# Patient Record
Sex: FEMALE | Race: WHITE | Employment: UNEMPLOYED
[De-identification: names, ages, dates, MRNs, and addresses within clinical notes are randomized per-mention and may not be internally consistent; named-entity substitution may affect disease eponyms.]

---

## 2017-02-06 ENCOUNTER — TELEPHONE (OUTPATIENT)
Dept: FAMILY MEDICINE CLINIC | Facility: CLINIC | Age: 37
End: 2017-02-06

## 2017-02-16 ENCOUNTER — OFFICE VISIT (OUTPATIENT)
Dept: FAMILY MEDICINE CLINIC | Facility: CLINIC | Age: 37
End: 2017-02-16

## 2017-02-16 VITALS
HEART RATE: 78 BPM | BODY MASS INDEX: 28.16 KG/M2 | HEIGHT: 65 IN | DIASTOLIC BLOOD PRESSURE: 64 MMHG | SYSTOLIC BLOOD PRESSURE: 112 MMHG | WEIGHT: 169 LBS | OXYGEN SATURATION: 98 %

## 2017-02-16 DIAGNOSIS — L72.3 SEBACEOUS CYST: Primary | ICD-10-CM

## 2017-02-16 PROCEDURE — G8422 PT INELIG BMI CALCULATION: HCPCS | Performed by: FAMILY MEDICINE

## 2017-02-16 PROCEDURE — 99213 OFFICE O/P EST LOW 20 MIN: CPT | Performed by: FAMILY MEDICINE

## 2017-02-16 ASSESSMENT — ENCOUNTER SYMPTOMS
APNEA: 0
SHORTNESS OF BREATH: 0
CHEST TIGHTNESS: 0

## 2017-02-16 ASSESSMENT — PATIENT HEALTH QUESTIONNAIRE - PHQ9
SUM OF ALL RESPONSES TO PHQ9 QUESTIONS 1 & 2: 0
2. FEELING DOWN, DEPRESSED OR HOPELESS: 0
SUM OF ALL RESPONSES TO PHQ QUESTIONS 1-9: 0
1. LITTLE INTEREST OR PLEASURE IN DOING THINGS: 0

## 2018-05-31 ENCOUNTER — HOSPITAL ENCOUNTER (OUTPATIENT)
Age: 38
Setting detail: SPECIMEN
Discharge: HOME | End: 2018-05-31
Payer: MEDICARE

## 2018-05-31 ENCOUNTER — OFFICE VISIT (OUTPATIENT)
Dept: OBGYN CLINIC | Age: 38
End: 2018-05-31
Payer: MEDICARE

## 2018-05-31 VITALS
WEIGHT: 166 LBS | SYSTOLIC BLOOD PRESSURE: 116 MMHG | BODY MASS INDEX: 27.66 KG/M2 | HEIGHT: 65 IN | HEART RATE: 78 BPM | DIASTOLIC BLOOD PRESSURE: 70 MMHG

## 2018-05-31 DIAGNOSIS — R10.2 PELVIC PRESSURE IN FEMALE: ICD-10-CM

## 2018-05-31 DIAGNOSIS — Z01.419 WELL WOMAN EXAM WITH ROUTINE GYNECOLOGICAL EXAM: ICD-10-CM

## 2018-05-31 DIAGNOSIS — Z12.31 SCREENING MAMMOGRAM, ENCOUNTER FOR: Primary | ICD-10-CM

## 2018-05-31 DIAGNOSIS — N81.89 LOSS OF PERINEAL BODY, FEMALE: ICD-10-CM

## 2018-05-31 DIAGNOSIS — Z01.419 WELL WOMAN EXAM WITH ROUTINE GYNECOLOGICAL EXAM: Primary | ICD-10-CM

## 2018-05-31 DIAGNOSIS — N81.11 CYSTOCELE, MIDLINE: ICD-10-CM

## 2018-05-31 LAB
BILIRUBIN URINE: NEGATIVE
COLOR: YELLOW
COMMENT UA: NORMAL
DIRECT EXAM: NORMAL
GLUCOSE URINE: NEGATIVE
KETONES, URINE: NEGATIVE
LEUKOCYTE ESTERASE, URINE: NEGATIVE
Lab: NORMAL
NITRITE, URINE: NEGATIVE
PH UA: 7 (ref 5–8)
PROTEIN UA: NEGATIVE
SPECIFIC GRAVITY UA: 1.02 (ref 1–1.03)
SPECIMEN DESCRIPTION: NORMAL
STATUS: NORMAL
TURBIDITY: CLEAR
URINE HGB: NEGATIVE
UROBILINOGEN, URINE: NORMAL

## 2018-05-31 PROCEDURE — 99385 PREV VISIT NEW AGE 18-39: CPT | Performed by: OBSTETRICS & GYNECOLOGY

## 2018-05-31 PROCEDURE — 81003 URINALYSIS AUTO W/O SCOPE: CPT

## 2018-05-31 PROCEDURE — 87591 N.GONORRHOEAE DNA AMP PROB: CPT

## 2018-05-31 PROCEDURE — 87624 HPV HI-RISK TYP POOLED RSLT: CPT

## 2018-05-31 PROCEDURE — 87660 TRICHOMONAS VAGIN DIR PROBE: CPT

## 2018-05-31 PROCEDURE — 87480 CANDIDA DNA DIR PROBE: CPT

## 2018-05-31 PROCEDURE — 87510 GARDNER VAG DNA DIR PROBE: CPT

## 2018-05-31 PROCEDURE — G0123 SCREEN CERV/VAG THIN LAYER: HCPCS

## 2018-05-31 PROCEDURE — 87491 CHLMYD TRACH DNA AMP PROBE: CPT

## 2018-06-01 LAB
C TRACH DNA GENITAL QL NAA+PROBE: NEGATIVE
HPV SAMPLE: NORMAL
HPV SOURCE: NORMAL
HPV, GENOTYPE 16: NOT DETECTED
HPV, GENOTYPE 18: NOT DETECTED
HPV, HIGH RISK OTHER: NOT DETECTED
HPV, INTERPRETATION: NORMAL
N. GONORRHOEAE DNA: NEGATIVE

## 2018-06-07 ENCOUNTER — HOSPITAL ENCOUNTER (OUTPATIENT)
Age: 38
Setting detail: SPECIMEN
Discharge: HOME | End: 2018-06-07
Payer: MEDICARE

## 2018-06-07 ENCOUNTER — OFFICE VISIT (OUTPATIENT)
Dept: FAMILY MEDICINE CLINIC | Age: 38
End: 2018-06-07
Payer: MEDICARE

## 2018-06-07 VITALS
BODY MASS INDEX: 27.76 KG/M2 | DIASTOLIC BLOOD PRESSURE: 63 MMHG | RESPIRATION RATE: 16 BRPM | SYSTOLIC BLOOD PRESSURE: 106 MMHG | HEART RATE: 72 BPM | HEIGHT: 65 IN | OXYGEN SATURATION: 98 % | WEIGHT: 166.6 LBS

## 2018-06-07 DIAGNOSIS — Z00.00 ENCOUNTER FOR WELL ADULT EXAM WITHOUT ABNORMAL FINDINGS: ICD-10-CM

## 2018-06-07 DIAGNOSIS — Z00.00 ENCOUNTER FOR WELL ADULT EXAM WITHOUT ABNORMAL FINDINGS: Primary | ICD-10-CM

## 2018-06-07 DIAGNOSIS — Z80.0 FAMILY HISTORY OF COLON CANCER IN MOTHER: ICD-10-CM

## 2018-06-07 LAB
ABSOLUTE EOS #: 0.17 K/UL (ref 0–0.44)
ABSOLUTE IMMATURE GRANULOCYTE: <0.03 K/UL (ref 0–0.3)
ABSOLUTE LYMPH #: 1.82 K/UL (ref 1.1–3.7)
ABSOLUTE MONO #: 0.51 K/UL (ref 0.1–1.2)
ALBUMIN SERPL-MCNC: 4.2 G/DL (ref 3.5–5.2)
ALBUMIN/GLOBULIN RATIO: 1.4 (ref 1–2.5)
ALP BLD-CCNC: 68 U/L (ref 35–104)
ALT SERPL-CCNC: 9 U/L (ref 5–33)
ANION GAP SERPL CALCULATED.3IONS-SCNC: 10 MMOL/L (ref 9–17)
AST SERPL-CCNC: 12 U/L
BASOPHILS # BLD: 1 % (ref 0–2)
BASOPHILS ABSOLUTE: 0.03 K/UL (ref 0–0.2)
BILIRUB SERPL-MCNC: 0.36 MG/DL (ref 0.3–1.2)
BILIRUBIN URINE: NEGATIVE
BUN BLDV-MCNC: 15 MG/DL (ref 6–20)
BUN/CREAT BLD: NORMAL (ref 9–20)
CALCIUM SERPL-MCNC: 8.7 MG/DL (ref 8.6–10.4)
CHLORIDE BLD-SCNC: 105 MMOL/L (ref 98–107)
CO2: 25 MMOL/L (ref 20–31)
COLOR: YELLOW
COMMENT UA: NORMAL
CREAT SERPL-MCNC: 0.55 MG/DL (ref 0.5–0.9)
DIFFERENTIAL TYPE: NORMAL
EOSINOPHILS RELATIVE PERCENT: 3 % (ref 1–4)
GFR AFRICAN AMERICAN: >60 ML/MIN
GFR NON-AFRICAN AMERICAN: >60 ML/MIN
GFR SERPL CREATININE-BSD FRML MDRD: NORMAL ML/MIN/{1.73_M2}
GFR SERPL CREATININE-BSD FRML MDRD: NORMAL ML/MIN/{1.73_M2}
GLUCOSE BLD-MCNC: 83 MG/DL (ref 70–99)
GLUCOSE URINE: NEGATIVE
HCT VFR BLD CALC: 41.9 % (ref 36.3–47.1)
HEMOGLOBIN: 14 G/DL (ref 11.9–15.1)
IMMATURE GRANULOCYTES: 0 %
KETONES, URINE: NEGATIVE
LEUKOCYTE ESTERASE, URINE: NEGATIVE
LYMPHOCYTES # BLD: 32 % (ref 24–43)
MCH RBC QN AUTO: 31 PG (ref 25.2–33.5)
MCHC RBC AUTO-ENTMCNC: 33.4 G/DL (ref 28.4–34.8)
MCV RBC AUTO: 92.9 FL (ref 82.6–102.9)
MONOCYTES # BLD: 9 % (ref 3–12)
NITRITE, URINE: NEGATIVE
NRBC AUTOMATED: 0 PER 100 WBC
PDW BLD-RTO: 12.3 % (ref 11.8–14.4)
PH UA: 6.5 (ref 5–8)
PLATELET # BLD: 172 K/UL (ref 138–453)
PLATELET ESTIMATE: NORMAL
PMV BLD AUTO: 9.7 FL (ref 8.1–13.5)
POTASSIUM SERPL-SCNC: 4 MMOL/L (ref 3.7–5.3)
PROTEIN UA: NEGATIVE
RBC # BLD: 4.51 M/UL (ref 3.95–5.11)
RBC # BLD: NORMAL 10*6/UL
SEG NEUTROPHILS: 55 % (ref 36–65)
SEGMENTED NEUTROPHILS ABSOLUTE COUNT: 3.21 K/UL (ref 1.5–8.1)
SODIUM BLD-SCNC: 140 MMOL/L (ref 135–144)
SPECIFIC GRAVITY UA: 1.02 (ref 1–1.03)
THYROXINE, FREE: 1.17 NG/DL (ref 0.93–1.7)
TOTAL PROTEIN: 7.3 G/DL (ref 6.4–8.3)
TSH SERPL DL<=0.05 MIU/L-ACNC: 2.62 MIU/L (ref 0.3–5)
TURBIDITY: CLEAR
URINE HGB: NEGATIVE
UROBILINOGEN, URINE: NORMAL
WBC # BLD: 5.8 K/UL (ref 3.5–11.3)
WBC # BLD: NORMAL 10*3/UL

## 2018-06-07 PROCEDURE — 99395 PREV VISIT EST AGE 18-39: CPT | Performed by: FAMILY MEDICINE

## 2018-06-07 ASSESSMENT — PATIENT HEALTH QUESTIONNAIRE - PHQ9
1. LITTLE INTEREST OR PLEASURE IN DOING THINGS: 0
SUM OF ALL RESPONSES TO PHQ QUESTIONS 1-9: 0
2. FEELING DOWN, DEPRESSED OR HOPELESS: 0
SUM OF ALL RESPONSES TO PHQ9 QUESTIONS 1 & 2: 0

## 2018-06-08 LAB — THYROID PEROXIDASE (TPO) AB: <10 IU/ML (ref 0–35)

## 2018-06-25 LAB — CYTOLOGY REPORT: NORMAL

## 2018-06-29 ENCOUNTER — TELEPHONE (OUTPATIENT)
Dept: OBGYN CLINIC | Age: 38
End: 2018-06-29

## 2018-06-29 NOTE — TELEPHONE ENCOUNTER
----- Message from RAH Laguerre CNP sent at 6/25/2018  7:59 AM EDT -----  Pap smear with insufficient cells- predominately mucus  Please let patient know and schedule in office for repeat pap smear

## 2018-07-24 ENCOUNTER — TELEPHONE (OUTPATIENT)
Dept: OBGYN CLINIC | Age: 38
End: 2018-07-24

## 2018-07-24 NOTE — TELEPHONE ENCOUNTER
----- Message from Silvano Apley, APRN - CNP sent at 6/25/2018  7:59 AM EDT -----  Pap smear with insufficient cells- predominately mucus  Please let patient know and schedule in office for repeat pap smear

## 2018-11-12 ENCOUNTER — OFFICE VISIT (OUTPATIENT)
Dept: FAMILY MEDICINE CLINIC | Age: 38
End: 2018-11-12
Payer: MEDICARE

## 2018-11-12 VITALS
TEMPERATURE: 97.6 F | HEART RATE: 100 BPM | SYSTOLIC BLOOD PRESSURE: 114 MMHG | OXYGEN SATURATION: 96 % | DIASTOLIC BLOOD PRESSURE: 64 MMHG

## 2018-11-12 DIAGNOSIS — J01.90 ACUTE SINUSITIS, RECURRENCE NOT SPECIFIED, UNSPECIFIED LOCATION: Primary | ICD-10-CM

## 2018-11-12 PROCEDURE — 1036F TOBACCO NON-USER: CPT | Performed by: FAMILY MEDICINE

## 2018-11-12 PROCEDURE — G8484 FLU IMMUNIZE NO ADMIN: HCPCS | Performed by: FAMILY MEDICINE

## 2018-11-12 PROCEDURE — G8427 DOCREV CUR MEDS BY ELIG CLIN: HCPCS | Performed by: FAMILY MEDICINE

## 2018-11-12 PROCEDURE — 99213 OFFICE O/P EST LOW 20 MIN: CPT | Performed by: FAMILY MEDICINE

## 2018-11-12 PROCEDURE — G8419 CALC BMI OUT NRM PARAM NOF/U: HCPCS | Performed by: FAMILY MEDICINE

## 2018-11-12 RX ORDER — FLUTICASONE PROPIONATE 50 MCG
1 SPRAY, SUSPENSION (ML) NASAL DAILY
Qty: 2 BOTTLE | Refills: 1 | Status: SHIPPED | OUTPATIENT
Start: 2018-11-12 | End: 2019-09-10

## 2018-12-26 ENCOUNTER — TELEPHONE (OUTPATIENT)
Dept: OBGYN CLINIC | Age: 38
End: 2018-12-26

## 2019-05-01 ENCOUNTER — OFFICE VISIT (OUTPATIENT)
Dept: FAMILY MEDICINE CLINIC | Age: 39
End: 2019-05-01
Payer: MEDICARE

## 2019-05-01 ENCOUNTER — HOSPITAL ENCOUNTER (OUTPATIENT)
Age: 39
Setting detail: SPECIMEN
Discharge: HOME | End: 2019-05-01
Payer: MEDICARE

## 2019-05-01 VITALS
BODY MASS INDEX: 29.12 KG/M2 | SYSTOLIC BLOOD PRESSURE: 95 MMHG | OXYGEN SATURATION: 98 % | WEIGHT: 175 LBS | DIASTOLIC BLOOD PRESSURE: 63 MMHG | HEART RATE: 89 BPM

## 2019-05-01 DIAGNOSIS — N92.6 MISSED MENSES: ICD-10-CM

## 2019-05-01 DIAGNOSIS — G89.29 CHRONIC INTRACTABLE HEADACHE, UNSPECIFIED HEADACHE TYPE: ICD-10-CM

## 2019-05-01 DIAGNOSIS — N81.89 LOSS OF PERINEAL BODY, FEMALE: ICD-10-CM

## 2019-05-01 DIAGNOSIS — N81.11 CYSTOCELE, MIDLINE: ICD-10-CM

## 2019-05-01 DIAGNOSIS — R51.9 CHRONIC INTRACTABLE HEADACHE, UNSPECIFIED HEADACHE TYPE: ICD-10-CM

## 2019-05-01 DIAGNOSIS — M62.89 PELVIC FLOOR DYSFUNCTION IN FEMALE: Primary | ICD-10-CM

## 2019-05-01 DIAGNOSIS — Z80.0 FAMILY HISTORY OF COLON CANCER IN MOTHER: ICD-10-CM

## 2019-05-01 LAB
ABSOLUTE EOS #: 0.14 K/UL (ref 0–0.44)
ABSOLUTE IMMATURE GRANULOCYTE: 0.03 K/UL (ref 0–0.3)
ABSOLUTE LYMPH #: 1.97 K/UL (ref 1.1–3.7)
ABSOLUTE MONO #: 0.46 K/UL (ref 0.1–1.2)
ALBUMIN SERPL-MCNC: 4.4 G/DL (ref 3.5–5.2)
ALBUMIN/GLOBULIN RATIO: 1.5 (ref 1–2.5)
ALP BLD-CCNC: 72 U/L (ref 35–104)
ALT SERPL-CCNC: 8 U/L (ref 5–33)
ANION GAP SERPL CALCULATED.3IONS-SCNC: 11 MMOL/L (ref 9–17)
AST SERPL-CCNC: 11 U/L
BASOPHILS # BLD: 1 % (ref 0–2)
BASOPHILS ABSOLUTE: 0.04 K/UL (ref 0–0.2)
BILIRUB SERPL-MCNC: 0.41 MG/DL (ref 0.3–1.2)
BILIRUBIN URINE: NEGATIVE
BUN BLDV-MCNC: 19 MG/DL (ref 6–20)
BUN/CREAT BLD: ABNORMAL (ref 9–20)
CALCIUM SERPL-MCNC: 8.9 MG/DL (ref 8.6–10.4)
CHLORIDE BLD-SCNC: 106 MMOL/L (ref 98–107)
CHOLESTEROL/HDL RATIO: 2.4
CHOLESTEROL: 154 MG/DL
CO2: 24 MMOL/L (ref 20–31)
COLOR: YELLOW
COMMENT UA: NORMAL
CREAT SERPL-MCNC: 0.49 MG/DL (ref 0.5–0.9)
DIFFERENTIAL TYPE: ABNORMAL
EOSINOPHILS RELATIVE PERCENT: 3 % (ref 1–4)
GFR AFRICAN AMERICAN: >60 ML/MIN
GFR NON-AFRICAN AMERICAN: >60 ML/MIN
GFR SERPL CREATININE-BSD FRML MDRD: ABNORMAL ML/MIN/{1.73_M2}
GFR SERPL CREATININE-BSD FRML MDRD: ABNORMAL ML/MIN/{1.73_M2}
GLUCOSE BLD-MCNC: 97 MG/DL (ref 70–99)
GLUCOSE URINE: NEGATIVE
HCT VFR BLD CALC: 40.3 % (ref 36.3–47.1)
HDLC SERPL-MCNC: 63 MG/DL
HEMOGLOBIN: 13.5 G/DL (ref 11.9–15.1)
IMMATURE GRANULOCYTES: 1 %
KETONES, URINE: NEGATIVE
LDL CHOLESTEROL: 73 MG/DL (ref 0–130)
LEUKOCYTE ESTERASE, URINE: NEGATIVE
LYMPHOCYTES # BLD: 37 % (ref 24–43)
MCH RBC QN AUTO: 31.2 PG (ref 25.2–33.5)
MCHC RBC AUTO-ENTMCNC: 33.5 G/DL (ref 28.4–34.8)
MCV RBC AUTO: 93.1 FL (ref 82.6–102.9)
MONOCYTES # BLD: 9 % (ref 3–12)
NITRITE, URINE: NEGATIVE
NRBC AUTOMATED: 0 PER 100 WBC
PDW BLD-RTO: 12.6 % (ref 11.8–14.4)
PH UA: 8 (ref 5–8)
PLATELET # BLD: 203 K/UL (ref 138–453)
PLATELET ESTIMATE: ABNORMAL
PMV BLD AUTO: 9.7 FL (ref 8.1–13.5)
POTASSIUM SERPL-SCNC: 4.1 MMOL/L (ref 3.7–5.3)
PROTEIN UA: NEGATIVE
RBC # BLD: 4.33 M/UL (ref 3.95–5.11)
RBC # BLD: ABNORMAL 10*6/UL
SEG NEUTROPHILS: 49 % (ref 36–65)
SEGMENTED NEUTROPHILS ABSOLUTE COUNT: 2.74 K/UL (ref 1.5–8.1)
SODIUM BLD-SCNC: 141 MMOL/L (ref 135–144)
SPECIFIC GRAVITY UA: 1.02 (ref 1–1.03)
THYROXINE, FREE: 1.24 NG/DL (ref 0.93–1.7)
TOTAL PROTEIN: 7.3 G/DL (ref 6.4–8.3)
TRIGL SERPL-MCNC: 89 MG/DL
TSH SERPL DL<=0.05 MIU/L-ACNC: 2.05 MIU/L (ref 0.3–5)
TURBIDITY: CLEAR
URINE HGB: NEGATIVE
UROBILINOGEN, URINE: NORMAL
VLDLC SERPL CALC-MCNC: NORMAL MG/DL (ref 1–30)
WBC # BLD: 5.4 K/UL (ref 3.5–11.3)
WBC # BLD: ABNORMAL 10*3/UL

## 2019-05-01 PROCEDURE — G8427 DOCREV CUR MEDS BY ELIG CLIN: HCPCS | Performed by: FAMILY MEDICINE

## 2019-05-01 PROCEDURE — 1036F TOBACCO NON-USER: CPT | Performed by: FAMILY MEDICINE

## 2019-05-01 PROCEDURE — 99213 OFFICE O/P EST LOW 20 MIN: CPT | Performed by: FAMILY MEDICINE

## 2019-05-01 PROCEDURE — G8419 CALC BMI OUT NRM PARAM NOF/U: HCPCS | Performed by: FAMILY MEDICINE

## 2019-05-01 ASSESSMENT — PATIENT HEALTH QUESTIONNAIRE - PHQ9
2. FEELING DOWN, DEPRESSED OR HOPELESS: 0
SUM OF ALL RESPONSES TO PHQ QUESTIONS 1-9: 0
SUM OF ALL RESPONSES TO PHQ9 QUESTIONS 1 & 2: 0
SUM OF ALL RESPONSES TO PHQ QUESTIONS 1-9: 0
1. LITTLE INTEREST OR PLEASURE IN DOING THINGS: 0

## 2019-05-01 NOTE — PROGRESS NOTES
General FM note    Jesusita Cherry is a 34 y.o. female who presents today for follow up on her  medical conditions as noted below. Jesusita Cherry is c/o of   Chief Complaint   Patient presents with    Pelvic Pain       Patient Active Problem List:     Missed menses     Advanced maternal age in multigravida     Cystocele, midline grade 1     Pelvic pressure in female     Loss of perineal body, female     Past Medical History:   Diagnosis Date    Depression     mild occas, no meds    Gestational hypertension     Pregnancy 1 & 2      Past Surgical History:   Procedure Laterality Date    TONSILLECTOMY AND ADENOIDECTOMY      WISDOM TOOTH EXTRACTION       Family History   Problem Relation Age of Onset    Diabetes Maternal Grandmother     Breast Cancer Maternal Grandmother         unsure of age   Tushar Medina Diabetes Maternal Grandfather     Colon Cancer Mother 48    Cervical Cancer Mother     Cancer Sister         hodgkins    Hypertension Father      Current Outpatient Medications   Medication Sig Dispense Refill    fluticasone (FLONASE) 50 MCG/ACT nasal spray 1 spray by Each Nare route daily 1 Spray in each nostril 2 Bottle 1     No current facility-administered medications for this visit. ALLERGIES:  No Known Allergies    Social History     Tobacco Use    Smoking status: Never Smoker    Smokeless tobacco: Never Used   Substance Use Topics    Alcohol use: No     Alcohol/week: 0.0 oz      Body mass index is 29.12 kg/m². BP 95/63   Pulse 89   Wt 175 lb (79.4 kg)   SpO2 98%   BMI 29.12 kg/m²     Subjective:      HPI    33 yo female here for pain at her tail bone progressing started around 18 years ago. Pain is there at time very severe. When getting up pain there. Weekly pain in her lower pelvic area and also at the sacrum and rectal area. Had a period twice this month. Never had this before. Was seen at the Gyn before - \"cystocele grade 1, loss of perineal body\".   The patient tells me that this has been going on for quite some time. She also has an extensive history of cancer in her family history. Her mom was diagnosed with colon cancer then with cervical cancer. A sister had Hodgkin's lymphoma. Multiple aunts in her family had breast cancer. The patient feels that she needs to take care of herself, that she needs to make sure everything is in order. Review of Systems   Constitutional: Negative for fever and unexpected weight change. Respiratory: Negative for cough and shortness of breath. Cardiovascular: Negative for chest pain and leg swelling. Gastrointestinal: Negative for diarrhea, constipation and blood in stool. Endocrine: Negative for polydipsia and polyuria. Genitourinary: Negative for dysuria and hematuria. Positive for pelvic discomfort/pain. Musculoskeletal: Negative for back pain and gait problem. Positive for sacral pain. Skin: Negative for color change and rash. Objective:   Physical Exam  Constitutional: VS (see above). General appearance: normal development, habitus and attention, no deformities. No distress. Eyes: normal conjunctiva and lids. CAV: RRR, no RMG. No edema lower extremities. Pulmo: CTA bilateral, no CWR. Skin: no rashes, lesions or ulcers. Musculoskeletal: normal gait. Nails: no clubbing or cyanosis. Psychiatric: alert and oriented to place, time and person. Normal mood and affect. Assessment:       Diagnosis Orders   1. Pelvic floor dysfunction in female  US PELVIS COMPLETE    US NON OB TRANSVAGINAL    XR SACRUM COCCYX (MIN 2 VIEWS)   2. Family history of colon cancer in mother  External Referral To Gastroenterology   3. Missed menses  US PELVIS COMPLETE    US NON OB TRANSVAGINAL    XR SACRUM COCCYX (MIN 2 VIEWS)    CBC Auto Differential    Urinalysis    TSH without Reflex    T4, Free    Lipid Panel    Comprehensive Metabolic Panel   4.  Loss of perineal body, female  US PELVIS COMPLETE    US NON OB TRANSVAGINAL    XR SACRUM COCCYX (MIN 2 VIEWS)    CBC Auto Differential    Urinalysis    TSH without Reflex    T4, Free    Lipid Panel    Comprehensive Metabolic Panel   5. Cystocele, midline grade 1  US PELVIS COMPLETE    US NON OB TRANSVAGINAL    XR SACRUM COCCYX (MIN 2 VIEWS)    CBC Auto Differential    Urinalysis    TSH without Reflex    T4, Free    Lipid Panel    Comprehensive Metabolic Panel   6. Chronic intractable headache, unspecified headache type  CBC Auto Differential    Urinalysis    TSH without Reflex    T4, Free    Lipid Panel    Comprehensive Metabolic Panel       Plan:   I discussed with patient that she needs a close follow-up with a gynecologist but also I will refer her to a gastroenterologist for a colonoscopy. The patient will have blood work done and then again she needs to make sure that she follows up with any symptoms. Call or return to clinic prn if these symptoms worsen or fail to improve as anticipated. I have reviewed the instructions with the patient, answering all questions to her satisfaction. No follow-ups on file. Orders Placed This Encounter   Procedures    US PELVIS COMPLETE     Standing Status:   Future     Standing Expiration Date:   5/1/2020    US NON OB TRANSVAGINAL     Standing Status:   Future     Standing Expiration Date:   5/1/2020    XR SACRUM COCCYX (MIN 2 VIEWS)     Standing Status:   Future     Standing Expiration Date:   5/1/2020    CBC Auto Differential     Standing Status:   Future     Number of Occurrences:   1     Standing Expiration Date:   5/1/2020    Urinalysis     Standing Status:   Future     Number of Occurrences:   1     Standing Expiration Date:   5/1/2020     Order Specific Question:   SPECIFY(EX-CATH,MIDSTREAM,CYSTO,ETC)?      Answer:   midstream    TSH without Reflex     Standing Status:   Future     Number of Occurrences:   1     Standing Expiration Date:   5/1/2020    T4, Free     Standing Status:   Future     Number of Occurrences:   1     Standing Expiration Date:   5/1/2020 depression     Electronically signed by Silvestre Acosta MD on 5/1/2019 at 12:48 PM       (Please note that portions of this note were completed with a voice recognition program. Efforts were made to edit the dictations but occasionally words are mis-transcribed.)

## 2019-05-01 NOTE — PROGRESS NOTES
Visit Information    Have you changed or started any medications since your last visit including any over-the-counter medicines, vitamins, or herbal medicines? no   Are you having any side effects from any of your medications? -  no  Have you stopped taking any of your medications? Is so, why? -  no    Have you seen any other physician or provider since your last visit? No  Have you had any other diagnostic tests since your last visit? No  Have you been seen in the emergency room and/or had an admission to a hospital since we last saw you? No  Have you had your routine dental cleaning in the past 6 months? no    Have you activated your [employer] account? If not, what are your barriers?  Yes     Patient Care Team:  Karen Carrizales MD as PCP - General (Family Medicine)  Karen Carrizales MD as PCP - [facility] Attributed Provider    Medical History Review  Past Medical, Family, and Social History reviewed and does not contribute to the patient presenting condition    Health Maintenance   Topic Date Due    HIV screen  12/04/1993    DTaP/Tdap/Td vaccine (1 - Tdap) 12/04/1997    Lipid screen  12/04/2018    Flu vaccine (Season Ended) 09/01/2019    Cervical cancer screen  05/31/2023    Pneumococcal 0-64 years Vaccine  Aged Out

## 2019-06-20 ENCOUNTER — HOSPITAL ENCOUNTER (OUTPATIENT)
Age: 39
Setting detail: SPECIMEN
Discharge: HOME | End: 2019-06-20
Payer: MEDICARE

## 2019-06-20 ENCOUNTER — OFFICE VISIT (OUTPATIENT)
Dept: FAMILY MEDICINE CLINIC | Age: 39
End: 2019-06-20
Payer: MEDICARE

## 2019-06-20 VITALS
HEART RATE: 77 BPM | DIASTOLIC BLOOD PRESSURE: 73 MMHG | OXYGEN SATURATION: 100 % | SYSTOLIC BLOOD PRESSURE: 107 MMHG | BODY MASS INDEX: 29.12 KG/M2 | HEIGHT: 65 IN

## 2019-06-20 DIAGNOSIS — R55 VASOVAGAL SYNCOPE: Primary | ICD-10-CM

## 2019-06-20 DIAGNOSIS — R55 VASOVAGAL SYNCOPE: ICD-10-CM

## 2019-06-20 LAB
ANION GAP SERPL CALCULATED.3IONS-SCNC: 13 MMOL/L (ref 9–17)
BILIRUBIN URINE: NEGATIVE
BUN BLDV-MCNC: 25 MG/DL (ref 6–20)
BUN/CREAT BLD: ABNORMAL (ref 9–20)
CALCIUM SERPL-MCNC: 8.7 MG/DL (ref 8.6–10.4)
CHLORIDE BLD-SCNC: 105 MMOL/L (ref 98–107)
CO2: 23 MMOL/L (ref 20–31)
COLOR: YELLOW
COMMENT UA: ABNORMAL
CREAT SERPL-MCNC: 0.65 MG/DL (ref 0.5–0.9)
GFR AFRICAN AMERICAN: >60 ML/MIN
GFR NON-AFRICAN AMERICAN: >60 ML/MIN
GFR SERPL CREATININE-BSD FRML MDRD: ABNORMAL ML/MIN/{1.73_M2}
GFR SERPL CREATININE-BSD FRML MDRD: ABNORMAL ML/MIN/{1.73_M2}
GLUCOSE BLD-MCNC: 94 MG/DL (ref 70–99)
GLUCOSE URINE: NEGATIVE
KETONES, URINE: NEGATIVE
LEUKOCYTE ESTERASE, URINE: NEGATIVE
NITRITE, URINE: NEGATIVE
PH UA: 6.5 (ref 5–8)
POTASSIUM SERPL-SCNC: 3.8 MMOL/L (ref 3.7–5.3)
PROTEIN UA: NEGATIVE
SODIUM BLD-SCNC: 141 MMOL/L (ref 135–144)
SPECIFIC GRAVITY UA: 1.03 (ref 1–1.03)
TURBIDITY: CLEAR
URINE HGB: NEGATIVE
UROBILINOGEN, URINE: NORMAL

## 2019-06-20 PROCEDURE — 99214 OFFICE O/P EST MOD 30 MIN: CPT | Performed by: FAMILY MEDICINE

## 2019-06-20 PROCEDURE — G8428 CUR MEDS NOT DOCUMENT: HCPCS | Performed by: FAMILY MEDICINE

## 2019-06-20 PROCEDURE — 1036F TOBACCO NON-USER: CPT | Performed by: FAMILY MEDICINE

## 2019-06-20 PROCEDURE — G8419 CALC BMI OUT NRM PARAM NOF/U: HCPCS | Performed by: FAMILY MEDICINE

## 2019-06-20 NOTE — PROGRESS NOTES
General FM note    Amber Ayala is a 34 y.o. female who presents today for follow up on her  medical conditions as noted below. Amber Ayala is c/o of   Chief Complaint   Patient presents with    Headache    Dizziness       Patient Active Problem List:     Missed menses     Advanced maternal age in multigravida     Cystocele, midline grade 1     Pelvic pressure in female     Loss of perineal body, female     Past Medical History:   Diagnosis Date    Depression     mild occas, no meds    Gestational hypertension     Pregnancy 1 & 2      Past Surgical History:   Procedure Laterality Date    TONSILLECTOMY AND ADENOIDECTOMY      WISDOM TOOTH EXTRACTION       Family History   Problem Relation Age of Onset    Diabetes Maternal Grandmother     Breast Cancer Maternal Grandmother         unsure of age   Willard Flax Diabetes Maternal Grandfather     Colon Cancer Mother 48    Cervical Cancer Mother     Cancer Sister         hodgkins    Hypertension Father      Current Outpatient Medications   Medication Sig Dispense Refill    fluticasone (FLONASE) 50 MCG/ACT nasal spray 1 spray by Each Nare route daily 1 Spray in each nostril 2 Bottle 1     No current facility-administered medications for this visit. ALLERGIES:  No Known Allergies    Social History     Tobacco Use    Smoking status: Never Smoker    Smokeless tobacco: Never Used   Substance Use Topics    Alcohol use: No     Alcohol/week: 0.0 oz      Body mass index is 29.12 kg/m². /73   Pulse 77   Ht 5' 5\" (1.651 m)   SpO2 100%   BMI 29.12 kg/m²     Subjective:      HPI    35 yo female here for concerns about almost passing out. Syncope  Patient complains of near syncope. Onset was 10 days ago. Symptoms have  worsened since that time. Patient describes the episode as never actually lost consciousness, had precursor Sx only, including feeling of almost losing consciousness, nausea, severe lightheadedness, vertigo. Associated symptoms: nausea. The patient denies tachycardia/palpitations. Medications putting patient at risk for syncope: none. Happened another times almost when standing. The patient tells me that the first episode she had was at a graduation party she get up from a table and she was walking to restroom when she started to feel dizzy started like feeling that she would pass out. Afterwards she was sitting down eating some crackers drinking this feeling passed now she had it at least 2 other times where she had similar symptoms when getting up walking she had this feeling like passing out. She never actually did pass out. She denies any other symptoms including headaches vision changes chest pain chest discomfort palpitations shortness of breath breathing difficulties any diet changes any changes in weight. Has not followed up with the uro-gynecologist as discussed with her at her last visit. Review of Systems   Constitutional: Negative for fever and unexpected weight change. Positive for passing out feeling. Respiratory: Negative for cough and shortness of breath. Cardiovascular: Negative for chest pain and leg swelling. Gastrointestinal: Negative for diarrhea, constipation and blood in stool. Skin: Negative for color change and rash. Neurological: Negative for dizziness and headaches. Objective:   Physical Exam  Constitutional: VS (see above). General appearance: normal development, habitus and attention, no deformities. No distress. Eyes: normal conjunctiva and lids. CAV: RRR, no RMG. No edema lower extremities. Pulmo: CTA bilateral, no CWR. Skin: no rashes, lesions or ulcers. Musculoskeletal: normal gait. Nails: no clubbing or cyanosis. Psychiatric: alert and oriented to place, time and person. Normal mood and affect. Assessment:       Diagnosis Orders   1. Vasovagal syncope  [facility]) - Kushal Soto MD, Cardiology, [facility]    Urinalysis    Basic Metabolic Panel       Plan:    To me this sounds more low so like vasovagal syncope. I discussed with patient to eat and drink enough if getting up she needs to make sure to move her legs to get her blood flowing. Follow-up with cardiology. Get blood work done if this does not get better patient needs to come back. Call or return to clinic prn if these symptoms worsen or fail to improve as anticipated. I have reviewed the instructions with the patient, answering all questions to her satisfaction. Return in about 6 months (around 12/20/2019), or if symptoms worsen or fail to improve. Orders Placed This Encounter   Procedures    Urinalysis     Standing Status:   Future     Number of Occurrences:   1     Standing Expiration Date:   6/20/2020     Order Specific Question:   SPECIFY(EX-CATH,MIDSTREAM,CYSTO,ETC)? Answer:   midstream    Basic Metabolic Panel     Standing Status:   Future     Number of Occurrences:   1     Standing Expiration Date:   6/20/2020    [facility]) - Kd Moore MD, Cardiology, [facility]     Referral Priority:   Routine     Referral Type:   Eval and Treat     Referral Reason:   Specialty Services Required     Referred to Provider:   Tu Holland MD     Requested Specialty:   Cardiology     Number of Visits Requested:   1     No orders of the defined types were placed in this encounter. Ugo received counseling on the following healthy behaviors: nutrition and exercise  Reviewed prior labs and health maintenance. Continue current medications, diet and exercise. Discussed use, benefit, and side effects of prescribed medications. Barriers to medication compliance addressed. Patient given educational materials - see patient instructions. All patient questions answered. Patient voiced understanding.       Electronically signed by Alia Phelan MD on 6/21/2019 at 6:43 AM       (Please note that portions of this note were completed with a voice recognition program. Efforts were made to edit the dictations but occasionally words are mis-transcribed.)

## 2019-06-20 NOTE — PROGRESS NOTES
Visit Information    Have you changed or started any medications since your last visit including any over-the-counter medicines, vitamins, or herbal medicines? no   Are you having any side effects from any of your medications? -  no  Have you stopped taking any of your medications? Is so, why? -  no    Have you seen any other physician or provider since your last visit? No  Have you had any other diagnostic tests since your last visit? No  Have you been seen in the emergency room and/or had an admission to a hospital since we last saw you? No  Have you had your routine dental cleaning in the past 6 months? no    Have you activated your [employer] account? If not, what are your barriers?  Yes     Patient Care Team:  Latrice Bruce MD as PCP - General (Family Medicine)  Latrice Bruce MD as PCP - [facility]    Medical History Review  Past Medical, Family, and Social History reviewed and does not contribute to the patient presenting condition    Health Maintenance   Topic Date Due    HIV screen  12/04/1993    DTaP/Tdap/Td vaccine (1 - Tdap) 12/04/1997    Flu vaccine (Season Ended) 09/01/2019    Cervical cancer screen  05/31/2023    Lipid screen  05/01/2024    Pneumococcal 0-64 years Vaccine  Aged Out

## 2019-06-24 ENCOUNTER — TELEPHONE (OUTPATIENT)
Dept: FAMILY MEDICINE CLINIC | Age: 39
End: 2019-06-24

## 2019-06-24 DIAGNOSIS — N81.89 LOSS OF PERINEAL BODY, FEMALE: ICD-10-CM

## 2019-06-24 DIAGNOSIS — N92.6 MISSED MENSES: ICD-10-CM

## 2019-06-24 DIAGNOSIS — N81.11 CYSTOCELE, MIDLINE: ICD-10-CM

## 2019-06-24 DIAGNOSIS — M62.89 PELVIC FLOOR DYSFUNCTION IN FEMALE: ICD-10-CM

## 2019-06-24 NOTE — TELEPHONE ENCOUNTER
Pt looking for xray results from [facility] from 5/1/19. I called med Records and they will be faxing the results.

## 2019-08-02 ENCOUNTER — HOSPITAL ENCOUNTER (OUTPATIENT)
Dept: ULTRASOUND IMAGING | Age: 39
Discharge: HOME | End: 2019-08-04
Payer: MEDICARE

## 2019-08-02 DIAGNOSIS — M62.89 PELVIC FLOOR DYSFUNCTION IN FEMALE: ICD-10-CM

## 2019-08-02 DIAGNOSIS — N81.89 LOSS OF PERINEAL BODY, FEMALE: ICD-10-CM

## 2019-08-02 DIAGNOSIS — N92.6 MISSED MENSES: ICD-10-CM

## 2019-08-02 DIAGNOSIS — N81.11 CYSTOCELE, MIDLINE: ICD-10-CM

## 2019-08-02 PROCEDURE — 76856 US EXAM PELVIC COMPLETE: CPT

## 2019-08-02 PROCEDURE — 76830 TRANSVAGINAL US NON-OB: CPT

## 2019-09-10 ENCOUNTER — HOSPITAL ENCOUNTER (OUTPATIENT)
Age: 39
Setting detail: SPECIMEN
Discharge: HOME | End: 2019-09-10
Payer: MEDICARE

## 2019-09-10 ENCOUNTER — OFFICE VISIT (OUTPATIENT)
Dept: OBGYN CLINIC | Age: 39
End: 2019-09-10
Payer: MEDICARE

## 2019-09-10 VITALS
DIASTOLIC BLOOD PRESSURE: 78 MMHG | HEART RATE: 69 BPM | WEIGHT: 175 LBS | SYSTOLIC BLOOD PRESSURE: 122 MMHG | BODY MASS INDEX: 29.16 KG/M2 | HEIGHT: 65 IN

## 2019-09-10 DIAGNOSIS — Z01.419 WOMEN'S ANNUAL ROUTINE GYNECOLOGICAL EXAMINATION: Primary | ICD-10-CM

## 2019-09-10 DIAGNOSIS — M53.3 SACRAL BACK PAIN: ICD-10-CM

## 2019-09-10 DIAGNOSIS — N81.10 PROLAPSE OF ANTERIOR VAGINAL WALL: ICD-10-CM

## 2019-09-10 DIAGNOSIS — Z12.31 ENCOUNTER FOR SCREENING MAMMOGRAM FOR BREAST CANCER: ICD-10-CM

## 2019-09-10 DIAGNOSIS — R10.2 PELVIC PAIN: ICD-10-CM

## 2019-09-10 DIAGNOSIS — N84.1 CERVICAL POLYP: ICD-10-CM

## 2019-09-10 PROCEDURE — 99396 PREV VISIT EST AGE 40-64: CPT | Performed by: OBSTETRICS & GYNECOLOGY

## 2019-09-10 ASSESSMENT — ENCOUNTER SYMPTOMS
SHORTNESS OF BREATH: 0
COUGH: 0
ABDOMINAL PAIN: 0
BACK PAIN: 0

## 2019-09-12 LAB
HPV SAMPLE: NORMAL
HPV, GENOTYPE 16: NOT DETECTED
HPV, GENOTYPE 18: NOT DETECTED
HPV, HIGH RISK OTHER: NOT DETECTED
HPV, INTERPRETATION: NORMAL
SPECIMEN DESCRIPTION: NORMAL

## 2019-09-18 LAB — CYTOLOGY REPORT: NORMAL

## 2020-03-18 ENCOUNTER — TELEPHONE (OUTPATIENT)
Dept: OBGYN CLINIC | Age: 40
End: 2020-03-18

## 2020-03-18 NOTE — TELEPHONE ENCOUNTER
Patient is calling in to let you know she passed a very large clot this morning ( said said bigger than a half dollar). She said she is on her Cycle day 4-5 and her bleeding had gotten lighter yesterday and then this morning she passed the clot. She has been having clots for the past few months she said and she just wanted to update you but did not want to come into the office. Advised patient to monitor her bleeding and call the office if she starts bleeding through more than a pad a hour. She kept telling me to let you know about her clots because you know her history and you guys were trying to find an underlying issue the last time she was in. Advised patient we would reach out if you had any other recommendations.

## 2020-04-07 ENCOUNTER — NURSE TRIAGE (OUTPATIENT)
Dept: OTHER | Facility: CLINIC | Age: 40
End: 2020-04-07

## 2020-04-07 ENCOUNTER — TELEMEDICINE (OUTPATIENT)
Dept: FAMILY MEDICINE CLINIC | Age: 40
End: 2020-04-07
Payer: MEDICARE

## 2020-04-07 PROCEDURE — G8428 CUR MEDS NOT DOCUMENT: HCPCS | Performed by: FAMILY MEDICINE

## 2020-04-07 PROCEDURE — 99213 OFFICE O/P EST LOW 20 MIN: CPT | Performed by: FAMILY MEDICINE

## 2020-04-07 NOTE — PROGRESS NOTES
she thinks that she is right around her ovulation time. The patient says that she has more so  soft stools then loose stools. She has not had an appetite for 4 days and then again some hours during the days she is totally fine and then the pain comes back again. Overall she does not feel well. She feels somewhat sick. She has tried heat and Tylenol for right lower quadrant pain which did not help at all. She still has her appendix. Review of Systems   Constitutional: Negative for fever and unexpected weight change. Pertinent items are noted in HPI. Objective:   Physical Exam  Constitutional: VS (see above). General appearance: normal development, habitus and attention, no deformities. The patient looks little distress. Tongue: moist.  Pulmo: No breathing difficulties observed. Physical Exam  Abdominal:          Comments: Tenderness with radiation. Abdomen seems soft. Skin: no rashes, lesions or ulcers. Musculoskeletal: normal gait. Nails: no clubbing or cyanosis. Psychiatric: alert and oriented to place, time and person. Normal mood and affect. Assessment:       Diagnosis Orders   1. Acute right lower quadrant pain  CBC Auto Differential    CT ABDOMEN PELVIS WO CONTRAST Additional Contrast? None       Plan:   I discussed with patient that this could have multiple causes. It could be her appendix which could be inflamed could be some colitis. Could be her ovaries could be ovarian cyst.  The patient will have a CT of her abdomen done. She will have blood work done. If the pain gets severe she will go to the ER. Call or return to clinic prn if these symptoms worsen or fail to improve as anticipated. I have reviewed the instructions with the patient, answering all questions to her satisfaction. Linda Lynn is a 37 y.o. female being evaluated by a Virtual Visit (video visit) encounter to address concerns as mentioned above. A caregiver was present when appropriate.  Due to

## 2020-04-08 ENCOUNTER — PATIENT MESSAGE (OUTPATIENT)
Dept: FAMILY MEDICINE CLINIC | Age: 40
End: 2020-04-08

## 2020-04-08 ENCOUNTER — TELEPHONE (OUTPATIENT)
Dept: FAMILY MEDICINE CLINIC | Age: 40
End: 2020-04-08

## 2020-04-08 NOTE — TELEPHONE ENCOUNTER
Pt is scheduled for CT abd/pelvis on 04/15/2020 at 1 PM, no solid food 2 hrs before the adelaida. Should be there by [phone].

## 2020-04-10 ENCOUNTER — PATIENT MESSAGE (OUTPATIENT)
Dept: FAMILY MEDICINE CLINIC | Age: 40
End: 2020-04-10

## 2020-04-10 NOTE — TELEPHONE ENCOUNTER
From: Kala Mauro  To: Neville Larios MD  Sent: 4/10/2020 9:23 AM EDT  Subject: Visit Follow-Up Question    Can you please make sure that my scan on Wednesday will cover my backside pelvis. Thats where post pain is up near my rectum. The pain in my right side had gone higher up but has lessened. So it feels like my ribs are just bruised now. I just want to make sure to cover ribs to bottom well if this is our shot to find wha devonte going on. The issue I've reported to you could be tied with the one a I've been reporting last couple years and trying to find answers for. Please let me know if you receive this. Thank you so much Dr. Ashok Magaña      ----- Message -----   Arlene Penn MD   Sent:4/8/2020 9:02 AM EDT   To:Ugo Hart   Subject:RE: Visit Follow-Up Question    It can't be scheduled sooner because of your insurance. Again if you develop any acute severe constant pain you need to go to the ED. Thank you.      ----- Message -----   From:Ugo Pickett   Sent:4/8/2020 8:30 AM EDT   Priscilla Salgado MD   Subject:Visit Follow-Up Question    Thank you for scheduling me but they scheduled me for next Wednesday. Is there nothing sooner? Appreciate you!

## 2020-04-15 ENCOUNTER — HOSPITAL ENCOUNTER (OUTPATIENT)
Dept: CT IMAGING | Age: 40
Discharge: HOME | End: 2020-04-17
Payer: MEDICARE

## 2020-04-15 PROCEDURE — 74176 CT ABD & PELVIS W/O CONTRAST: CPT

## 2020-06-30 ENCOUNTER — OFFICE VISIT (OUTPATIENT)
Dept: FAMILY MEDICINE CLINIC | Age: 40
End: 2020-06-30
Payer: MEDICARE

## 2020-06-30 VITALS
HEART RATE: 77 BPM | OXYGEN SATURATION: 96 % | DIASTOLIC BLOOD PRESSURE: 66 MMHG | SYSTOLIC BLOOD PRESSURE: 111 MMHG | WEIGHT: 187 LBS | BODY MASS INDEX: 31.12 KG/M2 | TEMPERATURE: 97.9 F

## 2020-06-30 PROCEDURE — G8427 DOCREV CUR MEDS BY ELIG CLIN: HCPCS | Performed by: FAMILY MEDICINE

## 2020-06-30 PROCEDURE — 99213 OFFICE O/P EST LOW 20 MIN: CPT | Performed by: FAMILY MEDICINE

## 2020-06-30 PROCEDURE — G8417 CALC BMI ABV UP PARAM F/U: HCPCS | Performed by: FAMILY MEDICINE

## 2020-06-30 PROCEDURE — 1036F TOBACCO NON-USER: CPT | Performed by: FAMILY MEDICINE

## 2020-06-30 ASSESSMENT — PATIENT HEALTH QUESTIONNAIRE - PHQ9
SUM OF ALL RESPONSES TO PHQ QUESTIONS 1-9: 0
SUM OF ALL RESPONSES TO PHQ QUESTIONS 1-9: 0
1. LITTLE INTEREST OR PLEASURE IN DOING THINGS: 0
SUM OF ALL RESPONSES TO PHQ9 QUESTIONS 1 & 2: 0
2. FEELING DOWN, DEPRESSED OR HOPELESS: 0

## 2020-06-30 NOTE — PROGRESS NOTES
General FM note    Rosa Isela Quinones is a 37 y.o. female who presents today for follow up on her  medical conditions as noted below. Rosa Isela Quinones is c/o of   Chief Complaint   Patient presents with    Other     foster care paperwork       Patient Active Problem List:     Missed menses     Advanced maternal age in multigravida     Cystocele, midline grade 1     Pelvic pressure in female     Loss of perineal body, female     Cervical polyp     Past Medical History:   Diagnosis Date    Depression     mild occas, no meds    Gestational hypertension     Pregnancy 1 & 2      Past Surgical History:   Procedure Laterality Date    TONSILLECTOMY AND ADENOIDECTOMY      WISDOM TOOTH EXTRACTION       Family History   Problem Relation Age of Onset    Diabetes Maternal Grandmother     Breast Cancer Maternal Grandmother         unsure of age   Rosaline Turk Diabetes Maternal Grandfather     Colon Cancer Mother 48    Cervical Cancer Mother     Cancer Sister         hodgkins    Hypertension Father      No current outpatient medications on file. No current facility-administered medications for this visit. ALLERGIES:  No Known Allergies    Social History     Tobacco Use    Smoking status: Never Smoker    Smokeless tobacco: Never Used   Substance Use Topics    Alcohol use: No     Alcohol/week: 0.0 standard drinks      Body mass index is 31.12 kg/m². /66   Pulse 77   Temp 97.9 °F (36.6 °C)   Wt 187 lb (84.8 kg)   SpO2 96%   BMI 31.12 kg/m²     Subjective:      HPI    36 yo female coming today with couple of problems. She would like to have a form signed for foster care. 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 She was a  before and she really enjoyed it. Also would like to have a order for mammogram.  Also would like to have the number from her previous gastroenterologist so she can make an appointment and have a colonoscopy done.   Patient is also

## 2020-07-14 ENCOUNTER — PATIENT MESSAGE (OUTPATIENT)
Dept: FAMILY MEDICINE CLINIC | Age: 40
End: 2020-07-14

## 2020-07-14 NOTE — TELEPHONE ENCOUNTER
From: Génesis Humphrey  To: Cheri Gil MD  Sent: 7/14/2020 7:34 AM EDT  Subject: Visit Follow-Up Question    Good morning, I know I saw you just two weeks ago but I wanted to tell you that I am in having more pain in these two areas and I feel like something is brewing. Meaning, I feel my body is giving me time to check it out before It's an emergency. I would like to be sent to have an ultrasound for my gallbladder ASAP. I looked up kidney issues and appendicitis but since I'm not having a lot of the extra symptoms that are listed it could be my gallbladder. Would you be able to send me for this right away? Thank you so much and if I don't hear from you here to this morning I'll try calling.

## 2020-07-14 NOTE — TELEPHONE ENCOUNTER
From: Chayo Crum  To: Erica Zuñiga MD  Sent: 7/14/2020 12:33 PM EDT  Subject: Visit Follow-Up Question    Pain is in this area in attachment and earlier pics I've sent. I'd much rather be proactive then wait until it's an emergency. Thanks for your help. Can we look at my CT scan from 6 weeks, similar issue, for any inflammation. Or get another one ordered? request blood work     request ultrasound of gallbladder , appendix anything else you may think can be causing this. Symptoms :   Pain pretty regularly last 2-3 days. Pain level varies from a 2- 6 on a scale from 1-10.    Wakes me at night starting last night  Slight nausea and lightheaded at times

## 2020-07-16 ENCOUNTER — OFFICE VISIT (OUTPATIENT)
Dept: FAMILY MEDICINE CLINIC | Age: 40
End: 2020-07-16
Payer: MEDICARE

## 2020-07-16 ENCOUNTER — HOSPITAL ENCOUNTER (OUTPATIENT)
Age: 40
Setting detail: SPECIMEN
Discharge: HOME | End: 2020-07-16
Payer: MEDICARE

## 2020-07-16 VITALS
DIASTOLIC BLOOD PRESSURE: 65 MMHG | BODY MASS INDEX: 30.49 KG/M2 | HEART RATE: 77 BPM | WEIGHT: 183.2 LBS | TEMPERATURE: 97.5 F | SYSTOLIC BLOOD PRESSURE: 114 MMHG | OXYGEN SATURATION: 99 %

## 2020-07-16 LAB
ABSOLUTE EOS #: 0.1 K/UL (ref 0–0.44)
ABSOLUTE IMMATURE GRANULOCYTE: <0.03 K/UL (ref 0–0.3)
ABSOLUTE LYMPH #: 2.07 K/UL (ref 1.1–3.7)
ABSOLUTE MONO #: 0.47 K/UL (ref 0.1–1.2)
ALBUMIN SERPL-MCNC: 4.3 G/DL (ref 3.5–5.2)
ALBUMIN/GLOBULIN RATIO: 1.5 (ref 1–2.5)
ALP BLD-CCNC: 65 U/L (ref 35–104)
ALT SERPL-CCNC: 13 U/L (ref 5–33)
ANION GAP SERPL CALCULATED.3IONS-SCNC: 15 MMOL/L (ref 9–17)
AST SERPL-CCNC: 14 U/L
BASOPHILS # BLD: 1 % (ref 0–2)
BASOPHILS ABSOLUTE: 0.04 K/UL (ref 0–0.2)
BILIRUB SERPL-MCNC: 0.63 MG/DL (ref 0.3–1.2)
BILIRUBIN, POC: NORMAL
BLOOD URINE, POC: NORMAL
BUN BLDV-MCNC: 20 MG/DL (ref 6–20)
BUN/CREAT BLD: NORMAL (ref 9–20)
C-REACTIVE PROTEIN: 1.1 MG/L (ref 0–5)
CALCIUM SERPL-MCNC: 9 MG/DL (ref 8.6–10.4)
CHLORIDE BLD-SCNC: 105 MMOL/L (ref 98–107)
CLARITY, POC: CLEAR
CO2: 24 MMOL/L (ref 20–31)
COLOR, POC: YELLOW
CONTROL: PRESENT
CREAT SERPL-MCNC: 0.51 MG/DL (ref 0.5–0.9)
DIFFERENTIAL TYPE: NORMAL
EOSINOPHILS RELATIVE PERCENT: 2 % (ref 1–4)
GFR AFRICAN AMERICAN: >60 ML/MIN
GFR NON-AFRICAN AMERICAN: >60 ML/MIN
GFR SERPL CREATININE-BSD FRML MDRD: NORMAL ML/MIN/{1.73_M2}
GFR SERPL CREATININE-BSD FRML MDRD: NORMAL ML/MIN/{1.73_M2}
GLUCOSE BLD-MCNC: 90 MG/DL (ref 70–99)
GLUCOSE URINE, POC: NORMAL
HCT VFR BLD CALC: 42.8 % (ref 36.3–47.1)
HEMOGLOBIN: 14.6 G/DL (ref 11.9–15.1)
IMMATURE GRANULOCYTES: 0 %
KETONES, POC: NORMAL
LEUKOCYTE EST, POC: NORMAL
LYMPHOCYTES # BLD: 35 % (ref 24–43)
MCH RBC QN AUTO: 31.6 PG (ref 25.2–33.5)
MCHC RBC AUTO-ENTMCNC: 34.1 G/DL (ref 28.4–34.8)
MCV RBC AUTO: 92.6 FL (ref 82.6–102.9)
MONOCYTES # BLD: 8 % (ref 3–12)
NITRITE, POC: NORMAL
NRBC AUTOMATED: 0 PER 100 WBC
PDW BLD-RTO: 12.5 % (ref 11.8–14.4)
PH, POC: 7
PLATELET # BLD: 191 K/UL (ref 138–453)
PLATELET ESTIMATE: NORMAL
PMV BLD AUTO: 9.7 FL (ref 8.1–13.5)
POTASSIUM SERPL-SCNC: 4.1 MMOL/L (ref 3.7–5.3)
PREGNANCY TEST URINE, POC: NORMAL
PROTEIN, POC: NORMAL
RBC # BLD: 4.62 M/UL (ref 3.95–5.11)
RBC # BLD: NORMAL 10*6/UL
SEG NEUTROPHILS: 54 % (ref 36–65)
SEGMENTED NEUTROPHILS ABSOLUTE COUNT: 3.16 K/UL (ref 1.5–8.1)
SODIUM BLD-SCNC: 144 MMOL/L (ref 135–144)
SPECIFIC GRAVITY, POC: 1.02
TOTAL PROTEIN: 7.1 G/DL (ref 6.4–8.3)
UROBILINOGEN, POC: 0.2
WBC # BLD: 5.9 K/UL (ref 3.5–11.3)
WBC # BLD: NORMAL 10*3/UL

## 2020-07-16 PROCEDURE — 99213 OFFICE O/P EST LOW 20 MIN: CPT | Performed by: FAMILY MEDICINE

## 2020-07-16 PROCEDURE — 1036F TOBACCO NON-USER: CPT | Performed by: FAMILY MEDICINE

## 2020-07-16 PROCEDURE — G8417 CALC BMI ABV UP PARAM F/U: HCPCS | Performed by: FAMILY MEDICINE

## 2020-07-16 PROCEDURE — 81025 URINE PREGNANCY TEST: CPT | Performed by: FAMILY MEDICINE

## 2020-07-16 PROCEDURE — 81003 URINALYSIS AUTO W/O SCOPE: CPT | Performed by: FAMILY MEDICINE

## 2020-07-16 PROCEDURE — G8427 DOCREV CUR MEDS BY ELIG CLIN: HCPCS | Performed by: FAMILY MEDICINE

## 2020-07-16 RX ORDER — LIDOCAINE 50 MG/G
1 PATCH TOPICAL DAILY
Qty: 10 PATCH | Refills: 0 | Status: SHIPPED | OUTPATIENT
Start: 2020-07-16 | End: 2020-07-26

## 2020-07-16 NOTE — PROGRESS NOTES
General FM note    Diana Mcmahon is a 37 y.o. female who presents today for follow up on her  medical conditions as noted below. Diana Mcmahon is c/o of   Chief Complaint   Patient presents with    2 Week Follow-Up    Lower Back Pain    Other     tingling in her fingers and lower arms       Patient Active Problem List:     Missed menses     Advanced maternal age in multigravida     Cystocele, midline grade 1     Pelvic pressure in female     Loss of perineal body, female     Cervical polyp     Past Medical History:   Diagnosis Date    Depression     mild occas, no meds    Gestational hypertension     Pregnancy 1 & 2      Past Surgical History:   Procedure Laterality Date    TONSILLECTOMY AND ADENOIDECTOMY      WISDOM TOOTH EXTRACTION       Family History   Problem Relation Age of Onset    Diabetes Maternal Grandmother     Breast Cancer Maternal Grandmother         unsure of age   [facility] Diabetes Maternal Grandfather     Colon Cancer Mother 48    Cervical Cancer Mother     Cancer Sister         hodgkins    Hypertension Father      Current Outpatient Medications   Medication Sig Dispense Refill    lidocaine (LIDODERM) 5 % Place 1 patch onto the skin daily for 10 days 12 hours on, 12 hours off. 10 patch 0     No current facility-administered medications for this visit. ALLERGIES:  No Known Allergies    Social History     Tobacco Use    Smoking status: Never Smoker    Smokeless tobacco: Never Used   Substance Use Topics    Alcohol use: No     Alcohol/week: 0.0 standard drinks      Body mass index is 30.49 kg/m². /65   Pulse 77   Temp 97.5 °F (36.4 °C)   Wt 183 lb 3.2 oz (83.1 kg)   SpO2 99%   BMI 30.49 kg/m²     Subjective:      HPI    78-year-old female coming in today for nonspecific back pain right lower quadrant pain which has been going on now for couple weeks. The patient had similar pain just couple months ago CT of her abdomen was done which was within normal limits.   But the patient states that she feels overall like chills she does not feel well she states there is some fatigue present again she says she feels that there is something wrong. Tells me that she has been having this right lower back pain radiates from the back to her right groin area but she states sometimes it goes from her right groin area to her back. Denies any fevers denies any headaches sore throat chest pains coughs acid reflux. No changes in bowel movement or urination. Review of Systems   Constitutional: Negative for fever and unexpected weight change. Pertinent items are noted in HPI. Objective:   Physical Exam  Constitutional: VS (see above). General appearance: normal development, habitus and attention, no deformities. No distress. Eyes: normal conjunctiva and lids. CAV: RRR, no RMG. No edema lower extremities. Pulmo: CTA bilateral, no CWR. Skin: no rashes, lesions or ulcers. Musculoskeletal: normal gait. Nails: no clubbing or cyanosis. Physical Exam  Musculoskeletal:        Back:        Psychiatric: alert and oriented to place, time and person. Normal mood and affect. Assessment:       Diagnosis Orders   1. Lumbar pain  XR LUMBAR SPINE (2-3 VIEWS)    lidocaine (LIDODERM) 5 %   2. Right lower quadrant abdominal pain  US ABDOMEN LIMITED    POCT Urinalysis No Micro (Auto)    POCT urine pregnancy   3. Chills  CBC Auto Differential    Comprehensive Metabolic Panel    C-Reactive Protein       Plan: We will order additional blood work. X-ray ordered also ultrasound of her abdomen provided. We will call patient with results. She will reach out if there is anything else going on. Call or return to clinic prn if these symptoms worsen or fail to improve as anticipated. I have reviewed the instructions with the patient, answering all questions to her satisfaction. Return in about 6 months (around 1/16/2021), or if symptoms worsen or fail to improve.   Orders Placed This Encounter Procedures    XR LUMBAR SPINE (2-3 VIEWS)     Standing Status:   Future     Number of Occurrences:   1     Standing Expiration Date:   7/16/2021    US ABDOMEN LIMITED     Standing Status:   Future     Standing Expiration Date:   7/16/2021    CBC Auto Differential     Standing Status:   Future     Number of Occurrences:   1     Standing Expiration Date:   7/16/2021    Comprehensive Metabolic Panel     Standing Status:   Future     Number of Occurrences:   1     Standing Expiration Date:   7/16/2021    C-Reactive Protein     Standing Status:   Future     Number of Occurrences:   1     Standing Expiration Date:   7/16/2021    POCT Urinalysis No Micro (Auto)    POCT urine pregnancy     Orders Placed This Encounter   Medications    lidocaine (LIDODERM) 5 %     Sig: Place 1 patch onto the skin daily for 10 days 12 hours on, 12 hours off. Dispense:  10 patch     Refill:  0       Jamithy received counseling on the following healthy behaviors: nutrition, exercise and medication adherence  Reviewed prior labs and health maintenance. Continue current medications, diet and exercise. Discussed use, benefit, and side effects of prescribed medications. Barriers to medication compliance addressed. Patient given educational materials - see patient instructions. All patient questions answered. Patient voiced understanding.       Electronically signed by Cheri Gil MD on 7/16/2020 at 4:53 PM       (Please note that portions of this note were completed with a voice recognition program. Efforts were made to edit the dictations but occasionally words are mis-transcribed.)

## 2020-07-17 ENCOUNTER — HOSPITAL ENCOUNTER (OUTPATIENT)
Dept: ULTRASOUND IMAGING | Facility: CLINIC | Age: 40
Discharge: HOME | End: 2020-07-19
Payer: MEDICARE

## 2020-07-17 ENCOUNTER — TELEPHONE (OUTPATIENT)
Dept: FAMILY MEDICINE CLINIC | Age: 40
End: 2020-07-17

## 2020-07-17 PROCEDURE — 76705 ECHO EXAM OF ABDOMEN: CPT

## 2020-07-20 ENCOUNTER — TELEPHONE (OUTPATIENT)
Dept: FAMILY MEDICINE CLINIC | Age: 40
End: 2020-07-20

## 2020-07-20 NOTE — TELEPHONE ENCOUNTER
Please order MRI from her lower back. Please make sure the patient knows to call her insurance to make sure it is paid for. Thank you.

## 2020-07-21 ENCOUNTER — PATIENT MESSAGE (OUTPATIENT)
Dept: FAMILY MEDICINE CLINIC | Age: 40
End: 2020-07-21

## 2020-07-22 ENCOUNTER — PATIENT MESSAGE (OUTPATIENT)
Dept: FAMILY MEDICINE CLINIC | Age: 40
End: 2020-07-22

## 2020-07-23 NOTE — TELEPHONE ENCOUNTER
From: Effie Damon  To: William Carlson MD  Sent: 7/22/2020 6:06 PM EDT  Subject: Visit Follow-Up Question    Sorry, I see I missed two calls from your office today I believe. Maybe it was her a different office. Can you not send me for MRI because we don't have a diagnosis yet? It's crazy that my test are fine but yet I'm still in pain. I need someone to look at my disk and organs. 3 request while we wait:     1) can we get ultrasound down on ovary, appendix, kidney to rule out those while we wait/start over with specialist?     2)can we run any more bloodwork? 3)since they won' t test me for Covid (since I don't have symptoms) can we do the antibody test now since I was sick back in April? I'm still trying to find an oncologist but we'll have to wait and wait for all that and I'm in pain everyday.       ----- Message -----   Isabel العلي MD   Sent:7/21/2020 5:15 PM EDT   To:Ugo Pickett   Subject:RE: Visit Follow-Up Question    I am not able to order a full body MRI. But I can refer you to an oncologist. Please let me know which oncologist your sister is seeing. Please let me know, thank you so much.      ----- Message -----   From:Ugo Pickett   Sent:7/21/2020 12:10 PM EDT   Kenneth Davis MD   Subject:Visit Follow-Up Question    Good morning, I tried to call back twice but the wait time was long. This is the best place to reach me if possible. The only thing I need from you is for you to honor my request for full body MRI scan since I've also had pain up in my neck, (my twin sister had Hodgkin's Lymphoma  throughout her body before they found it and it was almost too late) ear, head with this. My head can feel heavy and lightheaded at the same time. My mother also had all these current symptoms before they found her cancer. Peace of mind or taking action now would sure be helpful.  We've been trying to find answers for over a year now with some of this stuff

## 2020-07-24 ENCOUNTER — PATIENT MESSAGE (OUTPATIENT)
Dept: FAMILY MEDICINE CLINIC | Age: 40
End: 2020-07-24

## 2020-07-24 NOTE — TELEPHONE ENCOUNTER
From: Chayo Crum  To: Erica Zuñiga MD  Sent: 7/24/2020 10:37 AM EDT  Subject: Visit Follow-Up Question    Thank you for your response. However you were only able to address one of my four questions/request.     I worry about covering up an issue with medication but thank you. Maybe a rain check on that for now. This week has been much better with pain levels staying low but it most defiantly isn't something that's just going to go away. You have seen me for this last fall and then it flared up again in April and it flared up again 16 days ago. Every day I have the same pain and sometimes the pain is less and sometimes it's a lot more. Yesterday I had tingling in my hands and arms again and chest pain at times while at the store. It's happened a few times. I apologize for being a handful I just don't want something to burst. I know you're just as confused about this as I am but I can't grow weary of pursuing what's really going on inside. Getting a Covid test is probably my last concern but thank you for the order. I'm going to see Dr TOSIN Perez and see what she says. She sent me for a vaginal ultrasound last fall when I was having similar problems. I need to find out if I just need to have a hysterectomy (if it's safe with this unknown issue and/or needed) or if it's something else. I don't know who else to turn to and if you're not sure what else to do perhaps we should seek out other options. I will go ahead and ask you to send me to oncologist. Maybe they can help. See attachment for Dr Alok Nuno office. I talk to them and they are waiting to hear from you. She said that they should be even able to schedule it today for two weeks from now. Thanks again.       ----- Message -----   Anam Contreras MD   Sent:7/24/2020 10:02 AM EDT   To:Jamithy Sherryle Lie   Subject:RE: Visit Follow-Up Question    I know you not feeling good.  I can order additional blood work even the COVID-19 antibody test. Please let me know if we need to start any medication including Cymbalta for overall body aches. And then I am not sure what else to order. I know you are going to see a gynecologist. We just had a ultrasound of your gallbladder ordered which was with normal limits. The CT of the abdomen pelvis is still pending. Please let me know. Thank you.      ----- Message -----   From:Ugo Pickett   Sent:7/22/2020 6:06 PM EDT   Miguel Ángel Dos Santos MD   Subject:Visit Follow-Up Question    Sorry, I see I missed two calls from your office today I believe. Maybe it was her a different office. Can you not send me for MRI because we don't have a diagnosis yet? It's crazy that my test are fine but yet I'm still in pain. I need someone to look at my disk and organs. 3 request while we wait:     1) can we get ultrasound down on ovary, appendix, kidney to rule out those while we wait/start over with specialist?     2)can we run any more bloodwork? 3)since they won' t test me for Covid (since I don't have symptoms) can we do the antibody test now since I was sick back in April? I'm still trying to find an oncologist but we'll have to wait and wait for all that and I'm in pain everyday.       ----- Message -----   Porfirio Duggan MD   Sent:7/21/2020 5:15 PM EDT   To:Ugo Pickett   Subject:RE: Visit Follow-Up Question    I am not able to order a full body MRI. But I can refer you to an oncologist. Please let me know which oncologist your sister is seeing. Please let me know, thank you so much.      ----- Message -----   From:Ugo Pickett   Sent:7/21/2020 12:10 PM EDT   Miguel Ángel Dos Santos MD   Subject:Visit Follow-Up Question    Good morning, I tried to call back twice but the wait time was long. This is the best place to reach me if possible.      The only thing I need from you is for you to honor my request for full body MRI scan since I've also had pain up in my neck, (my twin sister had Hodgkin's Lymphoma  throughout her body before they found it and it was almost too late) ear, head with this. My head can feel heavy and lightheaded at the same time. My mother also had all these current symptoms before they found her cancer. Peace of mind or taking action now would sure be helpful. We've been trying to find answers for over a year now with some of this stuff and some much longer. I've been examined and have talked to OB/GYN's the last few years. I'm researching the benefits of a hysterectomy and the consequences of getting one currently. Please send me to [facility] for an open full body MRI scan. Thank you for your commitment and caring support for my health.

## 2020-07-27 ENCOUNTER — PATIENT MESSAGE (OUTPATIENT)
Dept: FAMILY MEDICINE CLINIC | Age: 40
End: 2020-07-27

## 2020-07-27 NOTE — TELEPHONE ENCOUNTER
From: Ana Croft  To: Germain Jimenez MD  Sent: 7/27/2020 7:16 AM EDT  Subject: Visit Follow-Up Question    Thank you. Who can I see for this chest pain? And to make sure I don't have a disk causing most of the back issues. ----- Message -----   Desiree Elena MD   Sent:7/27/2020 7:13 AM EDT   To:Ugo Lau   Subject:RE: Visit Follow-Up Question    Blood work ordered. Referral placed. Thank you.      ----- Message -----   From:Ugo Pickett   Sent:7/24/2020 10:37 AM EDT   Brayan Flowers MD   Subject:Visit Follow-Up Question    Thank you for your response. However you were only able to address one of my four questions/request.     I worry about covering up an issue with medication but thank you. Maybe a rain check on that for now. This week has been much better with pain levels staying low but it most defiantly isn't something that's just going to go away. You have seen me for this last fall and then it flared up again in April and it flared up again 16 days ago. Every day I have the same pain and sometimes the pain is less and sometimes it's a lot more. Yesterday I had tingling in my hands and arms again and chest pain at times while at the store. It's happened a few times. I apologize for being a handful I just don't want something to burst. I know you're just as confused about this as I am but I can't grow weary of pursuing what's really going on inside. Getting a Covid test is probably my last concern but thank you for the order. I'm going to see Dr Vesna Huddleston and see what she says. She sent me for a vaginal ultrasound last fall when I was having similar problems. I need to find out if I just need to have a hysterectomy (if it's safe with this unknown issue and/or needed) or if it's something else. I don't know who else to turn to and if you're not sure what else to do perhaps we should seek out other options.  I will go ahead and ask you to send me to oncologist. Maybe they can help. See attachment for Dr Hattie Francis office. I talk to them and they are waiting to hear from you. She said that they should be even able to schedule it today for two weeks from now. Thanks again.       ----- Message -----   Leola Olguin MD   Sent:7/24/2020 10:02 AM EDT   To:Ugo Kevin Dunlap   Subject:RE: Visit Follow-Up Question    I know you not feeling good. I can order additional blood work even the COVID-19 antibody test. Please let me know if we need to start any medication including Cymbalta for overall body aches. And then I am not sure what else to order. I know you are going to see a gynecologist. We just had a ultrasound of your gallbladder ordered which was with normal limits. The CT of the abdomen pelvis is still pending. Please let me know. Thank you.      ----- Message -----   From:Ugo Pickett   Sent:7/22/2020 6:06 PM EDT   Vero Phillips MD   Subject:Visit Follow-Up Question    Sorry, I see I missed two calls from your office today I believe. Maybe it was her a different office. Can you not send me for MRI because we don't have a diagnosis yet? It's crazy that my test are fine but yet I'm still in pain. I need someone to look at my disk and organs. 3 request while we wait:     1) can we get ultrasound down on ovary, appendix, kidney to rule out those while we wait/start over with specialist?     2)can we run any more bloodwork? 3)since they won' t test me for Covid (since I don't have symptoms) can we do the antibody test now since I was sick back in April? I'm still trying to find an oncologist but we'll have to wait and wait for all that and I'm in pain everyday.       ----- Message -----   Leola Olguin MD   Sent:7/21/2020 5:15 PM EDT   To:Ugo Pickett   Subject:RE: Visit Follow-Up Question    I am not able to order a full body MRI.  But I can refer you to an oncologist. Please let me know which oncologist your sister is seeing. Please let me know, thank you so much.      ----- Message -----   From:Ugo THOMPSON Nieves   Sent:7/21/2020 12:10 PM EDT   Zuleyma Navarro MD   Subject:Visit Follow-Up Question    Good morning, I tried to call back twice but the wait time was long. This is the best place to reach me if possible. The only thing I need from you is for you to honor my request for full body MRI scan since I've also had pain up in my neck, (my twin sister had Hodgkin's Lymphoma  throughout her body before they found it and it was almost too late) ear, head with this. My head can feel heavy and lightheaded at the same time. My mother also had all these current symptoms before they found her cancer. Peace of mind or taking action now would sure be helpful. We've been trying to find answers for over a year now with some of this stuff and some much longer. I've been examined and have talked to OB/GYN's the last few years. I'm researching the benefits of a hysterectomy and the consequences of getting one currently. Please send me to [facility] for an open full body MRI scan. Thank you for your commitment and caring support for my health.

## 2020-07-28 ENCOUNTER — PATIENT MESSAGE (OUTPATIENT)
Dept: FAMILY MEDICINE CLINIC | Age: 40
End: 2020-07-28

## 2020-07-29 ENCOUNTER — HOSPITAL ENCOUNTER (OUTPATIENT)
Age: 40
Setting detail: SPECIMEN
Discharge: HOME | End: 2020-07-29
Payer: MEDICARE

## 2020-07-29 LAB — SARS-COV-2 ANTIBODY, TOTAL: NEGATIVE

## 2020-07-29 NOTE — TELEPHONE ENCOUNTER
From: Phylicia Parikh  To: Zoie Maier MD  Sent: 7/28/2020 2:40 PM EDT  Subject: Test Results Question    I have a question about XR Lumbar Spine resulted on 7/16/20. Any findings? It won't show me any details on here.  Thank you

## 2020-08-03 ENCOUNTER — OFFICE VISIT (OUTPATIENT)
Dept: OBGYN CLINIC | Age: 40
End: 2020-08-03
Payer: MEDICARE

## 2020-08-03 ENCOUNTER — TELEPHONE (OUTPATIENT)
Dept: OBGYN CLINIC | Age: 40
End: 2020-08-03

## 2020-08-03 VITALS
SYSTOLIC BLOOD PRESSURE: 122 MMHG | BODY MASS INDEX: 30.32 KG/M2 | WEIGHT: 182 LBS | HEIGHT: 65 IN | HEART RATE: 80 BPM | DIASTOLIC BLOOD PRESSURE: 78 MMHG

## 2020-08-03 PROCEDURE — 1036F TOBACCO NON-USER: CPT | Performed by: OBSTETRICS & GYNECOLOGY

## 2020-08-03 PROCEDURE — 99213 OFFICE O/P EST LOW 20 MIN: CPT | Performed by: OBSTETRICS & GYNECOLOGY

## 2020-08-03 PROCEDURE — G8417 CALC BMI ABV UP PARAM F/U: HCPCS | Performed by: OBSTETRICS & GYNECOLOGY

## 2020-08-03 PROCEDURE — G8427 DOCREV CUR MEDS BY ELIG CLIN: HCPCS | Performed by: OBSTETRICS & GYNECOLOGY

## 2020-08-03 ASSESSMENT — ENCOUNTER SYMPTOMS
SHORTNESS OF BREATH: 0
COUGH: 0
BACK PAIN: 0
ABDOMINAL PAIN: 0

## 2020-08-03 NOTE — TELEPHONE ENCOUNTER
She can go to either. I just know Kaela Monroe better and she has been doing it for a long time which is why I referred her there, but [facility] is fine to go to as well.   Thanks

## 2020-08-03 NOTE — PROGRESS NOTES
[facility]  [facility] - [address] [facility]  Dept: [phone]  Dept Fax: [phone]    20    Chief Complaint   Patient presents with    Pelvic Pain     right side       Ugo Pickett 37 y.o. has a complaint of right sided pelvic pain. She is very concerned that something is wring because she has a family history of cancer and thinks that it was missed in her family members, so she wants to make sure she is fine. She says the pain started in April and was pretty bad, but today it is normal.  She had a CT and an ultrasound in the last year that both showed normal uterus and ovaries. She still has her pelvic/rectal pain. She does have a colonoscopy scheduled. Review of Systems   Constitutional: Negative for chills and fever. HENT: Negative for congestion. Respiratory: Negative for cough and shortness of breath. Cardiovascular: Negative for chest pain and palpitations. Gastrointestinal: Negative for abdominal pain. Musculoskeletal: Negative for back pain. Neurological: Negative for dizziness and light-headedness. Psychiatric/Behavioral: The patient is not nervous/anxious. Gynecologic History  Patient's last menstrual period was 2020. Contraception: condoms  Last Pap: 9/10/19  Results: normal  Last Mammogram: hasn't had one    Obstetric History  : 7  Para: 7  AB: 0    Past Medical History:   Diagnosis Date    Depression     mild occas, no meds    Gestational hypertension     Pregnancy 1 & 2     Past Surgical History:   Procedure Laterality Date    TONSILLECTOMY AND ADENOIDECTOMY      WISDOM TOOTH EXTRACTION       No Known Allergies  No current outpatient medications on file. No current facility-administered medications for this visit.       Social History     Socioeconomic History    Marital status:      Spouse name: Not on file    Number of children: Not on file    Years of education: Thought content normal.         Judgment: Judgment normal.         TVUS 8/2:    FINDINGS:         Measurements:         Uterus:  9.9 x 4.3 x 5.2 cm         Endometrial stripe:  16 mm         Right Ovary:  3.8 x 2.8 x 3.1 cm         Left Ovary:  1.9 x 1.8 x 2.2 cm              Ultrasound Findings:         Uterus: Uterus demonstrates normal myometrial echotexture.         Endometrial stripe: Endometrial stripe is at the upper limits of normal .         Right Ovary: Right ovary is within normal limits.         Left Ovary:  Left ovary is within normal limits.         Free Fluid: No evidence of free fluid. CT 4/15/20:  FINDINGS:    Lower Chest: No acute findings         Organs: Gallbladder, liver, pancreas, and spleen are unremarkable.         GI/Bowel: No bowel obstruction.  Appendix is normal.         Pelvis: No bladder stone.  No pelvic fluid collection.  No adenopathy.         Peritoneum/Retroperitoneum: No abdominal aortic aneurysm.  Adrenal glands    unremarkable. Izella Seed are unremarkable         Bones/Soft Tissues: No acute bony abnormality.              Impression    No renal or ureteral stone.  Appendix is normal.          Assessment/Plan  1. Pelvic pain  - Right sided. Discussed that she still has the referral to pelvic floor PT that she hasn't done yet to see if that will help. - Pt has colonoscopy scheduled. - If pain continues then can discuss a hysterectomy as pt would like everything out if the pain continues because she is concerned about cancer.       Pt to follow up in 2 months for annual exam and follow up after starting [address]ina Brito MD  [address]

## 2020-08-03 NOTE — TELEPHONE ENCOUNTER
Pt was wanting to know if you wanted her to see Gui Wallace specifically or if she can go to [facility] at [address] [facility] is closer for her but the Pt would like to see the doc that you recommend.

## 2020-08-11 ENCOUNTER — HOSPITAL ENCOUNTER (OUTPATIENT)
Age: 40
Setting detail: SPECIMEN
Discharge: HOME | End: 2020-08-11
Payer: MEDICARE

## 2020-08-11 ENCOUNTER — HOSPITAL ENCOUNTER (OUTPATIENT)
Dept: PHYSICAL THERAPY | Facility: CLINIC | Age: 40
Setting detail: THERAPIES SERIES
Discharge: HOME | End: 2020-08-11

## 2020-08-11 ENCOUNTER — OFFICE VISIT (OUTPATIENT)
Dept: DERMATOLOGY | Age: 40
End: 2020-08-11
Payer: MEDICARE

## 2020-08-11 VITALS
OXYGEN SATURATION: 98 % | TEMPERATURE: 96.7 F | HEIGHT: 65 IN | HEART RATE: 82 BPM | DIASTOLIC BLOOD PRESSURE: 75 MMHG | BODY MASS INDEX: 30.32 KG/M2 | WEIGHT: 182 LBS | SYSTOLIC BLOOD PRESSURE: 113 MMHG

## 2020-08-11 PROCEDURE — 11103 TANGNTL BX SKIN EA SEP/ADDL: CPT | Performed by: DERMATOLOGY

## 2020-08-11 PROCEDURE — 11102 TANGNTL BX SKIN SINGLE LES: CPT | Performed by: DERMATOLOGY

## 2020-08-11 RX ORDER — LIDOCAINE HYDROCHLORIDE AND EPINEPHRINE 10; 10 MG/ML; UG/ML
1 INJECTION, SOLUTION INFILTRATION; PERINEURAL ONCE
Status: COMPLETED | OUTPATIENT
Start: 2020-08-11 | End: 2020-08-11

## 2020-08-11 RX ADMIN — LIDOCAINE HYDROCHLORIDE AND EPINEPHRINE 1 ML: 10; 10 INJECTION, SOLUTION INFILTRATION; PERINEURAL at 09:23

## 2020-08-11 NOTE — PROGRESS NOTES
Findings:  Physical Exam  Skin:               Medical Necessity of Exam Performed:   Distribution of patient concerns    Additional Diagnostic Testing performed during exam: Not performed ,  Not performed    ASSESSMENT:   Diagnosis Orders   1. Neoplasm of uncertain behavior of skin  Surgical Pathology    TN TANGENTIAL BIOPSY SKIN SINGLE LESION    TN TANGENTIAL BIOPSY SKIN EA SEP/ADDITIONAL LESION       Plan of Action is as Follows:  Assessment 1. Neoplasm of uncertain behavior of skin  Shave Biopsy: After verbal consent including the risks of bleeding infection and scar and cleaning with alcohol the 2 lesions on the back was anesthetized with (LMX AND/OR 1% lidocaine with epinephrine) and was removed with a dermablade. Hemostasis was achieved with aluminum chloride and Vaseline and a bandage were applied.    - Surgical Pathology; Future  - TN TANGENTIAL BIOPSY SKIN SINGLE LESION  - TN TANGENTIAL BIOPSY SKIN EA SEP/ADDITIONAL LESION          Patient Instructions   - Follow up as needed    BIOPSY WOUND CARE    A biopsy is where a small piece of skin tissue is removed and examined by a pathologist.  When a biopsy is done, there is a small wound site that requires proper care to prevent infection and scarring. Some biopsies require sutures and their removal.    How to Care for Biopsy Wound    A.  Leave band-aid or dressing on for 24 hours. B. Wash two times a day with soap and water. C.  Let the wound air dry, then apply Vaseline ointment and cover with a Band-Aid       unless otherwise instructed by your provider. D. If there is slight discomfort, you may give acetaminophen or ibuprofen. Bleeding:  Every effort is made to stop bleeding prior to you leaving the dermatology clinic. Unfortunately, people will occasionally start to bleed after leaving the clinic. If you start to bleed hold firm pressure for 15 minutes to the area.  If you are on blood thinners I recommend keeping a product called wound seal (can buy at any drug store) at home as this can be a useful adjunct to stop bleeding. If after holding pressure for 15 minutes and/or applying wound seal the bleeding does not stop please call the dermatology clinic. When To Call the Doctor    Call the Dermatology Clinic or your doctor if any of the following occur:    A. Redness and swelling  B. Tenderness and warm to touch  C.  Drainage from wound  D. Fever    Biopsy Results    Biopsy results are usually available in 1-2 weeks. We provide biopsy results in letters for begin results or we will call for any concerning results. If you have not heard from our staff please call the office within 2 weeks. Please call our office with any concerns at [phone]. Photo surveillance performed: Yes    Follow-up: based on biopsy results    This note was created with the assistance of aspeech-recognition program.  Although the intention is to generate a document that actually reflects thecontent of the visit, no guarantees can be provided that every mistake has been identified and corrected by editing.     Electronically signed by Tristen Soni MD on 8/11/20 at 9:19 AM [place]

## 2020-08-17 ENCOUNTER — TELEPHONE (OUTPATIENT)
Dept: DERMATOLOGY | Age: 40
End: 2020-08-17

## 2020-08-17 LAB — DERMATOLOGY PATHOLOGY REPORT: NORMAL

## 2020-08-26 ENCOUNTER — TELEPHONE (OUTPATIENT)
Dept: DERMATOLOGY | Age: 40
End: 2020-08-26

## 2020-08-26 NOTE — TELEPHONE ENCOUNTER
That's fine - need to keep this in the inbox and if not scheduled within the next 4 weeks need to send fail to treat letters,    Debbie Osborne

## 2020-09-02 NOTE — TELEPHONE ENCOUNTER
Future Appointments   Date Time Provider Yolis Vega   10/28/2020  7:45 AM MD Knisey Macias [id number]

## 2020-09-08 NOTE — TELEPHONE ENCOUNTER
Left another message for pt to give the office a call to schedule hand grasp, leg strength strong and equal bilaterally

## 2020-09-11 ENCOUNTER — PATIENT MESSAGE (OUTPATIENT)
Dept: FAMILY MEDICINE CLINIC | Age: 40
End: 2020-09-11

## 2020-09-11 NOTE — TELEPHONE ENCOUNTER
From: Dimas Cooley  To: Mukund Castellanos MD  Sent: 9/11/2020 1:43 AM EDT  Subject: Test Results Question    Hello again. A few things. Unfortunately, I'm still having lots of pain in front lower and back sides on and off. Heating pads almost daily. And you sent me to cardiologist, thank you, but have had to wait and wait to get in. I still wake with spasms in my chest and then have tingling in my fingers. 1) If it's ok, I'd like to request labs to see if anything new comes to light. 2)I received a call [facility] scheduling) saying my MRI that you ordered can be completed but she was concerned it may not be coded well enough to pass through and/or have contrast. Is there anything else we can say or do for that to get done for me. We really need to look further into what's going on in my chest and with my side pain. 3) can we get me into orthopedist to take a look at my lower back pain.       ----- Message -----   Meridith Buerger, MD   Sent:7/29/2020 10:26 AM EDT   To:Ugo Pickett   Subject:RE: Test Results Question    No acute findings on the XR lumbar spine. Result was scanned into your chart. Thank you.      ----- Message -----   From:Ugo Pickett   Sent:7/28/2020 2:40 PM EDT    Sushil Jordan MD   Subject:Test Results Question    I have a question about XR Lumbar Spine resulted on 7/16/20. Any findings? It won't show me any details on here.  Thank you

## 2020-09-13 ENCOUNTER — PATIENT MESSAGE (OUTPATIENT)
Dept: FAMILY MEDICINE CLINIC | Age: 40
End: 2020-09-13

## 2020-09-14 ENCOUNTER — PATIENT MESSAGE (OUTPATIENT)
Dept: FAMILY MEDICINE CLINIC | Age: 40
End: 2020-09-14

## 2020-09-14 NOTE — TELEPHONE ENCOUNTER
From: Clarisse Mandujano  To: Natalie Gallegos MD  Sent: 9/14/2020 10:36 AM EDT  Subject: Visit Follow-Up Question    These are the same issues we've been trying to find answers to for months. Me coming in doesn't seem to help. I really dont want to go to [facility] unless we have to and it's a six month wait there to even get in. What can we do to get MRI done here in my chest and mid section? I don't understand why, at this point, you can't send me. Please help me understand! If you can't send me then please send me to someone who can. Preferable someone without an 8wk plus wait. Please let's get this out-of-the-way first. If something is found we can fix it here. If it's not than we'd have no choice to go to [facility] after that. Appreciate any HELP you can give.       ----- Message -----   Sherleen Schwab, MD   Sent:9/14/2020 9:56 AM EDT   To:Ugo Pickett   Subject:RE: Visit Follow-Up Question    I apologize but for all these symptoms you need to be seen at [facility] or see me back in the office. Thank you.      ----- Message -----   From:Ugo Pickett   Sent:9/13/2020 9:48 AM EDT   Cyrus Kiser MD   Subject:Visit Follow-Up Question    It's been a year dealing with this unknown. We've tried finding and we can't. My OBGYN said a hysterectomy isn't necessary unless we find something but everything she can see looks good. I'm miserable often and always feel off. Now my appetite is changing and my body only craves blander things. Current systems:   Pain for three days along with period  Short 2 day period   [facility] worse on day three. No relief. Soft stools to dirreAh. Day three.  Night/many times to bathroom   Crampy stomach (not much stomach pain at all) but like menstral pain  three nights on the couch  nausea evenings and mornings some   cant sleep due to pain  ibprophin three days doesnt seem to help  realizing the pain I used to have up inside my vagina has been gone and Im wondering if its moved up higher. hands still go tingle and numb   two nights ago sharp spasm like pain in chest shot me up out of bed again  Up very late or most the night and dont sleep well for months. Seriously can't sleep   no fever but body trembling Slightly defiantly doesn t like what's going on  front middle chest inside and under sternum felt like it was going to spasm but didnt   Mostly always feel sick/off    Something isn't right with me and I'm going to keep trying until we find it. If you can do anything to get me an MRI with contrast of both my chest and torso that will he do so much. I believe this is so important. If you can t send me for this who can? I'm wondering if it's time to go to [facility] or something. Thank you.

## 2020-09-14 NOTE — TELEPHONE ENCOUNTER
From: Una Felty  To: Vesna Sharma MD  Sent: 9/13/2020 9:48 AM EDT  Subject: Visit Follow-Up Question    It's been a year dealing with this unknown. We've tried finding and we can't. My OBGYN said a hysterectomy isn't necessary unless we find something but everything she can see looks good. I'm miserable often and always feel off. Now my appetite is changing and my body only craves blander things. Current systems:   Pain for three days along with period  Short 2 day period   [facility] worse on day three. No relief. Soft stools to dirreAh. Day three. Night/many times to bathroom   Crampy stomach (not much stomach pain at all) but like menstral pain  three nights on the couch  nausea evenings and mornings some   cant sleep due to pain  ibprophin three days doesnt seem to help  realizing the pain I used to have up inside my vagina has been gone and Im wondering if its moved up higher. hands still go tingle and numb   two nights ago sharp spasm like pain in chest shot me up out of bed again  Up very late or most the night and dont sleep well for months. Seriously can't sleep   no fever but body trembling Slightly defiantly doesn t like what's going on  front middle chest inside and under sternum felt like it was going to spasm but didnt   Mostly always feel sick/off    Something isn't right with me and I'm going to keep trying until we find it. If you can do anything to get me an MRI with contrast of both my chest and torso that will he do so much. I believe this is so important. If you can t send me for this who can? I'm wondering if it's time to go to [facility] or something. Thank you.

## 2020-09-18 ENCOUNTER — PATIENT MESSAGE (OUTPATIENT)
Dept: FAMILY MEDICINE CLINIC | Age: 40
End: 2020-09-18

## 2020-09-21 NOTE — TELEPHONE ENCOUNTER
From: Akil Perea  To: Tasha Clark MD  Sent: 9/18/2020 9:58 AM EDT  Subject: Test Results Question    I believe I had everything done the same day it was ordered. Thank you.       ----- Message -----   Irena Jamison MD   Sent:9/18/2020 6:46 AM EDT   To:Ugo Swift Norma   Subject:RE: Test Results Question    An order to orthopedic specialist was placed. A lot of blood work was ordered by the end of July. Did you have it done? Thank you.      ----- Message -----   From:Ugo Pickett   Sent:9/11/2020 1:43 AM EDT   Yuni Aviles MD   Subject:Test Results Question    Hello again. A few things. Unfortunately, I'm still having lots of pain in front lower and back sides on and off. Heating pads almost daily. And you sent me to cardiologist, thank you, but have had to wait and wait to get in. I still wake with spasms in my chest and then have tingling in my fingers. 1) If it's ok, I'd like to request labs to see if anything new comes to light. 2)I received a call [facility] scheduling) saying my MRI that you ordered can be completed but she was concerned it may not be coded well enough to pass through and/or have contrast. Is there anything else we can say or do for that to get done for me. We really need to look further into what's going on in my chest and with my side pain. 3) can we get me into orthopedist to take a look at my lower back pain.       ----- Message -----   Irena Jamison MD   Sent:7/29/2020 10:26 AM EDT   To:Ugo Pickett   Subject:RE: Test Results Question    No acute findings on the XR lumbar spine. Result was scanned into your chart. Thank you.      ----- Message -----   From:Ugo Pickett   Sent:7/28/2020 2:40 PM EDT   Yuni Aviles MD   Subject:Test Results Question    I have a question about XR Lumbar Spine resulted on 7/16/20. Any findings? It won't show me any details on here.  Thank you

## 2020-09-25 ENCOUNTER — TELEPHONE (OUTPATIENT)
Dept: FAMILY MEDICINE CLINIC | Age: 40
End: 2020-09-25

## 2020-09-25 DIAGNOSIS — R10.11 RUQ ABDOMINAL PAIN: ICD-10-CM

## 2020-09-25 DIAGNOSIS — R52 BURNING PAIN: ICD-10-CM

## 2020-09-25 DIAGNOSIS — R10.2 PELVIC PRESSURE IN FEMALE: ICD-10-CM

## 2020-09-25 DIAGNOSIS — R53.82 CHRONIC FATIGUE: ICD-10-CM

## 2020-09-25 DIAGNOSIS — M54.50 LUMBAR PAIN: ICD-10-CM

## 2020-09-25 DIAGNOSIS — R10.31 RIGHT LOWER QUADRANT ABDOMINAL PAIN: Primary | ICD-10-CM

## 2020-09-25 DIAGNOSIS — R10.2 PELVIC PRESSURE IN FEMALE: Primary | ICD-10-CM

## 2020-09-25 DIAGNOSIS — R10.31 RIGHT LOWER QUADRANT ABDOMINAL PAIN: ICD-10-CM

## 2020-09-30 NOTE — TELEPHONE ENCOUNTER
3rd letter sent with a patient refusal of treatment form for pt to sign if she does not intend on treating the atypical mole soon

## 2020-10-05 ENCOUNTER — HOSPITAL ENCOUNTER (OUTPATIENT)
Dept: MRI IMAGING | Facility: CLINIC | Age: 40
Discharge: HOME | End: 2020-10-07
Payer: MEDICARE

## 2020-10-05 ENCOUNTER — HOSPITAL ENCOUNTER (OUTPATIENT)
Age: 40
Setting detail: SPECIMEN
Discharge: HOME | End: 2020-10-05
Payer: MEDICARE

## 2020-10-05 ENCOUNTER — PATIENT MESSAGE (OUTPATIENT)
Dept: FAMILY MEDICINE CLINIC | Age: 40
End: 2020-10-05

## 2020-10-05 LAB
ABSOLUTE EOS #: 0.09 K/UL (ref 0–0.44)
ABSOLUTE IMMATURE GRANULOCYTE: <0.03 K/UL (ref 0–0.3)
ABSOLUTE LYMPH #: 1.13 K/UL (ref 1.1–3.7)
ABSOLUTE MONO #: 0.38 K/UL (ref 0.1–1.2)
ALBUMIN SERPL-MCNC: 4.3 G/DL (ref 3.5–5.2)
ALBUMIN/GLOBULIN RATIO: 1.5 (ref 1–2.5)
ALP BLD-CCNC: 80 U/L (ref 35–104)
ALT SERPL-CCNC: 8 U/L (ref 5–33)
ANION GAP SERPL CALCULATED.3IONS-SCNC: 15 MMOL/L (ref 9–17)
AST SERPL-CCNC: 12 U/L
BASOPHILS # BLD: 1 % (ref 0–2)
BASOPHILS ABSOLUTE: 0.03 K/UL (ref 0–0.2)
BILIRUB SERPL-MCNC: 0.42 MG/DL (ref 0.3–1.2)
BUN BLDV-MCNC: 17 MG/DL (ref 6–20)
BUN/CREAT BLD: ABNORMAL (ref 9–20)
C-REACTIVE PROTEIN: 1 MG/L (ref 0–5)
CALCIUM IONIZED: 1.31 MMOL/L (ref 1.13–1.33)
CALCIUM SERPL-MCNC: 9.3 MG/DL (ref 8.6–10.4)
CHLORIDE BLD-SCNC: 108 MMOL/L (ref 98–107)
CO2: 21 MMOL/L (ref 20–31)
CREAT SERPL-MCNC: 0.48 MG/DL (ref 0.5–0.9)
DIFFERENTIAL TYPE: ABNORMAL
EOSINOPHILS RELATIVE PERCENT: 2 % (ref 1–4)
GFR AFRICAN AMERICAN: >60 ML/MIN
GFR NON-AFRICAN AMERICAN: >60 ML/MIN
GFR SERPL CREATININE-BSD FRML MDRD: ABNORMAL ML/MIN/{1.73_M2}
GFR SERPL CREATININE-BSD FRML MDRD: ABNORMAL ML/MIN/{1.73_M2}
GLUCOSE BLD-MCNC: 85 MG/DL (ref 70–99)
HCT VFR BLD CALC: 42.4 % (ref 36.3–47.1)
HEMOGLOBIN: 14.2 G/DL (ref 11.9–15.1)
HEPATITIS C ANTIBODY: NONREACTIVE
IMMATURE GRANULOCYTES: 0 %
LYMPHOCYTES # BLD: 19 % (ref 24–43)
MCH RBC QN AUTO: 31.7 PG (ref 25.2–33.5)
MCHC RBC AUTO-ENTMCNC: 33.5 G/DL (ref 28.4–34.8)
MCV RBC AUTO: 94.6 FL (ref 82.6–102.9)
MONOCYTES # BLD: 6 % (ref 3–12)
NRBC AUTOMATED: 0 PER 100 WBC
PDW BLD-RTO: 12.6 % (ref 11.8–14.4)
PLATELET # BLD: 216 K/UL (ref 138–453)
PLATELET ESTIMATE: ABNORMAL
PMV BLD AUTO: 9.9 FL (ref 8.1–13.5)
POTASSIUM SERPL-SCNC: 4 MMOL/L (ref 3.7–5.3)
PTH INTACT: 46.54 PG/ML (ref 15–65)
RBC # BLD: 4.48 M/UL (ref 3.95–5.11)
RBC # BLD: ABNORMAL 10*6/UL
SEG NEUTROPHILS: 72 % (ref 36–65)
SEGMENTED NEUTROPHILS ABSOLUTE COUNT: 4.36 K/UL (ref 1.5–8.1)
SODIUM BLD-SCNC: 144 MMOL/L (ref 135–144)
THYROXINE, FREE: 1.23 NG/DL (ref 0.93–1.7)
TOTAL PROTEIN: 7.2 G/DL (ref 6.4–8.3)
TSH SERPL DL<=0.05 MIU/L-ACNC: 2.06 MIU/L (ref 0.3–5)
WBC # BLD: 6 K/UL (ref 3.5–11.3)
WBC # BLD: ABNORMAL 10*3/UL

## 2020-10-05 PROCEDURE — 6360000004 HC RX CONTRAST MEDICATION: Performed by: FAMILY MEDICINE

## 2020-10-05 PROCEDURE — A9579 GAD-BASE MR CONTRAST NOS,1ML: HCPCS | Performed by: FAMILY MEDICINE

## 2020-10-05 PROCEDURE — 72197 MRI PELVIS W/O & W/DYE: CPT

## 2020-10-05 RX ADMIN — GADOTERIDOL 20 ML: 279.3 INJECTION, SOLUTION INTRAVENOUS at 09:42

## 2020-10-05 NOTE — TELEPHONE ENCOUNTER
From: Gloria Burciaga  To: Jose David Jenkins MD  Sent: 10/5/2020 1:11 PM EDT  Subject: Visit Follow-Up Question    Good morning! I was able to get my pelvis MRI for today and am in the process of getting my MRI completed, thank you. I also got the blood work done today that was from July. Sadly, I was not aware that you already had an MRI of my lumbar and spine ready and waiting for me in my file since July. I also had no idea about the blood work that was ordered in July until you recently told me. So I had that taken care of as well. The Lord's timing in all things. *I'd like to take this time to request a referral to [facility] as we discussed a week or two ago. Let's go ahead and get it started since it takes six months to get an appointment. Thank you.

## 2020-10-06 LAB
SEDIMENTATION RATE, ERYTHROCYTE: 1 MM (ref 0–20)
THYROID PEROXIDASE (TPO) AB: <10 IU/ML (ref 0–35)

## 2020-10-06 NOTE — RESULT ENCOUNTER NOTE
Overall the MRI looks good. There was a possible fibroma present at her right ovary. Again this is just an MRI. Follow-up with [facility] clinic as the pain is continuing. Thank you.

## 2020-10-07 ENCOUNTER — PATIENT MESSAGE (OUTPATIENT)
Dept: FAMILY MEDICINE CLINIC | Age: 40
End: 2020-10-07

## 2020-10-08 NOTE — TELEPHONE ENCOUNTER
From: Rosio Cremichell  To: Bryant Cho MD  Sent: 10/7/2020 9:51 PM EDT  Subject: Visit Follow-Up Question    Please release my MRI into my chart so I can view it. A lady called today about blood work results. I asked her about my MRI results and she said she read it's \"possible\" I have fibroma near my ovary. Can I please have more information ASAP.       ----- Message -----   Shabbir Leon MD    Sent:10/5/2020 6:05 PM EDT   To:Ugo Verduzco   Subject:RE: Visit Follow-Up Question     I am happy you get everything done. You actually could call [facility] clinic and they might schedule an appointment even without any referrals. Please let me know. Thank you.      ----- Message -----   From:Ugo Pickett   Sent:10/5/2020 1:11 PM EDT   Rome Ca MD   Subject:Visit Follow-Up Question    Good morning! I was able to get my pelvis MRI for today and am in the process of getting my MRI completed, thank you. I also got the blood work done today that was from July. Sadly, I was not aware that you already had an MRI of my lumbar and spine ready and waiting for me in my file since July. I also had no idea about the blood work that was ordered in July until you recently told me. So I had that taken care of as well. The Lord's timing in all things. *I'd like to take this time to request a referral to [facility] as we discussed a week or two ago. Let's go ahead and get it started since it takes six months to get an appointment. Thank you.

## 2020-10-13 ENCOUNTER — TELEPHONE (OUTPATIENT)
Dept: FAMILY MEDICINE CLINIC | Age: 40
End: 2020-10-13

## 2020-10-14 ENCOUNTER — PATIENT MESSAGE (OUTPATIENT)
Dept: FAMILY MEDICINE CLINIC | Age: 40
End: 2020-10-14

## 2020-10-14 ENCOUNTER — HOSPITAL ENCOUNTER (OUTPATIENT)
Dept: MRI IMAGING | Facility: CLINIC | Age: 40
Discharge: HOME | End: 2020-10-16
Payer: MEDICARE

## 2020-10-14 PROCEDURE — 72148 MRI LUMBAR SPINE W/O DYE: CPT

## 2020-10-15 ENCOUNTER — PATIENT MESSAGE (OUTPATIENT)
Dept: FAMILY MEDICINE CLINIC | Age: 40
End: 2020-10-15

## 2020-10-15 NOTE — RESULT ENCOUNTER NOTE
Mild degenerative disc disease at the lumbosacral junction without canal  stenosis or foraminal narrowing. Thank you.

## 2020-10-15 NOTE — TELEPHONE ENCOUNTER
From: Clovis Taylor  To: Olegario Garcia MD  Sent: 10/14/2020 10:24 PM EDT  Subject: Visit Follow-Up Question    I have a question about XR Sacrum Coccyx resulted on 5/1/19. Was the results every found? And how ca I view this please?

## 2020-10-15 NOTE — TELEPHONE ENCOUNTER
From: Jyoti Riggins  To: Bianca Eden MD  Sent: 10/15/2020 11:54 AM EDT  Subject: Test Results Question    Please Release MRI results from 10-14-20. Insurance had five days to approve or deny coverage for these MRIs. No one contacted me and they said if they were denied they'd call and cancel my appointments. Neither happened. I went in and had no issues until yesterday (a week after my first MRI I received in the mail a denial letter). They deemed the first MRI (assuming the others as well) as medically unnecessary (due to coding /diagnosis) even after nearly a year testing. Isn't the imaging suppose to help us find the issue so you can diagnose me? Why so backwards. I have postponed my colonoscopy for two more weeks, due to their hesitation to do the procedure when there's unknown and unidentifiable pain. My appointment is on the 29th and I had planned to take in my pelvi and abdominal MRIs to show them so we could be sure the procedure would be safe to do on me. I am going to need help from your office to appeal this. And I will do what it takes. I may have to call everyday. It's not right to have pain for so long and jump through all the hoops and still be denied for medical test that hopefully reveal what we've been looking for.

## 2020-10-19 ENCOUNTER — PATIENT MESSAGE (OUTPATIENT)
Dept: FAMILY MEDICINE CLINIC | Age: 40
End: 2020-10-19

## 2020-10-20 ENCOUNTER — HOSPITAL ENCOUNTER (OUTPATIENT)
Dept: MRI IMAGING | Facility: CLINIC | Age: 40
Discharge: HOME | End: 2020-10-22
Payer: MEDICARE

## 2020-10-20 ENCOUNTER — PATIENT MESSAGE (OUTPATIENT)
Dept: FAMILY MEDICINE CLINIC | Age: 40
End: 2020-10-20

## 2020-10-20 PROCEDURE — 6360000004 HC RX CONTRAST MEDICATION: Performed by: FAMILY MEDICINE

## 2020-10-20 PROCEDURE — A9579 GAD-BASE MR CONTRAST NOS,1ML: HCPCS | Performed by: FAMILY MEDICINE

## 2020-10-20 PROCEDURE — 74183 MRI ABD W/O CNTR FLWD CNTR: CPT

## 2020-10-20 RX ADMIN — GADOTERIDOL 20 ML: 279.3 INJECTION, SOLUTION INTRAVENOUS at 09:44

## 2020-10-20 NOTE — TELEPHONE ENCOUNTER
From: Aaron Florentino  To: Vesta Escalera MD  Sent: 10/20/2020 3:14 PM EDT  Subject: Visit Follow-Up Question    I need a doctor to talk with me about these findings from 5/1/20 ASAP. No one has ever mentioned them. I'm not able to read them very well professionally, but what I can make out it doesn't look good. I just received a paper copy. Please send me to a specialist immediately. Why did we wait so long? [phone]      ----- Message -----   Andres Engle MD   Sent:10/20/2020 12:58 PM EDT   To:Ugo Marte Paget   Subject:RE: Visit Follow-Up Question    I will see you for a visit tomorrow morning. lets discuss again. I was actually trying to send you this report already per mail. Thank you.      ----- Message -----   From:Ugo Pickett   Sent:10/19/2020 5:34 PM EDT    Micah Muniz MD   Subject:Visit Follow-Up Question    I have a question about XR Sacrum Coccyx resulted on 5/1/19.      May I read this report please

## 2020-10-20 NOTE — TELEPHONE ENCOUNTER
From: Marla Elder  To: Melanie rBizuela MD  Sent: 10/19/2020 5:34 PM EDT  Subject: Visit Follow-Up Question    I have a question about XR Sacrum Coccyx resulted on 5/1/19.      May I read this report please

## 2020-10-22 NOTE — TELEPHONE ENCOUNTER
Sent patient a [employer] message, failure to treat form no received and no correspondence received from patient

## 2020-11-20 ENCOUNTER — TELEPHONE (OUTPATIENT)
Dept: DERMATOLOGY | Age: 40
End: 2020-11-20

## 2020-11-20 NOTE — TELEPHONE ENCOUNTER
Pt is having some nail splitting and pain up the fingers, randomly. New problem. Has been doing vinegar soaks but is starting to be concerned about infection. She called to see if she could be seen this afternoon. Advised no appts this afternoon as  is not in the office this afternoon. Advised my chart message with pictures but that will not work for her, contacting PCP or urgent care/ER since this is a new problem. She will reach out to PCP. She does already have an appt there Tuesday morning.

## 2020-11-24 ENCOUNTER — OFFICE VISIT (OUTPATIENT)
Dept: FAMILY MEDICINE CLINIC | Age: 40
End: 2020-11-24
Payer: MEDICARE

## 2020-11-24 ENCOUNTER — HOSPITAL ENCOUNTER (OUTPATIENT)
Dept: MRI IMAGING | Facility: CLINIC | Age: 40
Discharge: HOME | End: 2020-11-26
Payer: MEDICARE

## 2020-11-24 VITALS
TEMPERATURE: 97 F | OXYGEN SATURATION: 99 % | DIASTOLIC BLOOD PRESSURE: 66 MMHG | HEIGHT: 65 IN | BODY MASS INDEX: 30.59 KG/M2 | SYSTOLIC BLOOD PRESSURE: 107 MMHG | WEIGHT: 183.6 LBS | HEART RATE: 86 BPM

## 2020-11-24 PROCEDURE — 1036F TOBACCO NON-USER: CPT | Performed by: FAMILY MEDICINE

## 2020-11-24 PROCEDURE — G8484 FLU IMMUNIZE NO ADMIN: HCPCS | Performed by: FAMILY MEDICINE

## 2020-11-24 PROCEDURE — 99213 OFFICE O/P EST LOW 20 MIN: CPT | Performed by: FAMILY MEDICINE

## 2020-11-24 PROCEDURE — G8427 DOCREV CUR MEDS BY ELIG CLIN: HCPCS | Performed by: FAMILY MEDICINE

## 2020-11-24 PROCEDURE — G8417 CALC BMI ABV UP PARAM F/U: HCPCS | Performed by: FAMILY MEDICINE

## 2020-11-24 PROCEDURE — 72141 MRI NECK SPINE W/O DYE: CPT

## 2020-11-24 RX ORDER — TIZANIDINE 4 MG/1
4 TABLET ORAL NIGHTLY PRN
Qty: 7 TABLET | Refills: 0 | Status: SHIPPED | OUTPATIENT
Start: 2020-11-24 | End: 2020-12-01

## 2020-11-24 SDOH — ECONOMIC STABILITY: TRANSPORTATION INSECURITY
IN THE PAST 12 MONTHS, HAS THE LACK OF TRANSPORTATION KEPT YOU FROM MEDICAL APPOINTMENTS OR FROM GETTING MEDICATIONS?: NO

## 2020-11-24 SDOH — ECONOMIC STABILITY: INCOME INSECURITY: HOW HARD IS IT FOR YOU TO PAY FOR THE VERY BASICS LIKE FOOD, HOUSING, MEDICAL CARE, AND HEATING?: NOT HARD AT ALL

## 2020-11-24 SDOH — ECONOMIC STABILITY: TRANSPORTATION INSECURITY
IN THE PAST 12 MONTHS, HAS LACK OF TRANSPORTATION KEPT YOU FROM MEETINGS, WORK, OR FROM GETTING THINGS NEEDED FOR DAILY LIVING?: NO

## 2020-11-24 SDOH — ECONOMIC STABILITY: FOOD INSECURITY: WITHIN THE PAST 12 MONTHS, YOU WORRIED THAT YOUR FOOD WOULD RUN OUT BEFORE YOU GOT MONEY TO BUY MORE.: NEVER TRUE

## 2020-11-24 SDOH — ECONOMIC STABILITY: FOOD INSECURITY: WITHIN THE PAST 12 MONTHS, THE FOOD YOU BOUGHT JUST DIDN'T LAST AND YOU DIDN'T HAVE MONEY TO GET MORE.: NEVER TRUE

## 2020-11-24 ASSESSMENT — PATIENT HEALTH QUESTIONNAIRE - PHQ9
SUM OF ALL RESPONSES TO PHQ QUESTIONS 1-9: 0
SUM OF ALL RESPONSES TO PHQ9 QUESTIONS 1 & 2: 0
1. LITTLE INTEREST OR PLEASURE IN DOING THINGS: 0
SUM OF ALL RESPONSES TO PHQ QUESTIONS 1-9: 0
2. FEELING DOWN, DEPRESSED OR HOPELESS: 0
SUM OF ALL RESPONSES TO PHQ QUESTIONS 1-9: 0

## 2020-11-24 NOTE — PROGRESS NOTES
General FM note    Britney Canas is a 37 y.o. female who presents today for follow up on her  medical conditions as noted below. Britney Canas is c/o of   Chief Complaint   Patient presents with    Follow-up     discuss imaging results       Patient Active Problem List:     Missed menses     Advanced maternal age in multigravida     Cystocele, midline grade 1     Pelvic pressure in female     Loss of perineal body, female     Cervical polyp     Past Medical History:   Diagnosis Date    Depression     mild occas, no meds    Gestational hypertension     Pregnancy 1 & 2      Past Surgical History:   Procedure Laterality Date    TONSILLECTOMY AND ADENOIDECTOMY      WISDOM TOOTH EXTRACTION       Family History   Problem Relation Age of Onset    Diabetes Maternal Grandmother     Breast Cancer Maternal Grandmother         unsure of age   Ujdge Diabetes Maternal Grandfather     Colon Cancer Mother 48    Cervical Cancer Mother     Cancer Sister         hodgkins    Hypertension Father      Current Outpatient Medications   Medication Sig Dispense Refill    tiZANidine (ZANAFLEX) 4 MG tablet Take 1 tablet by mouth nightly as needed (msucle relaxans) 7 tablet 0     No current facility-administered medications for this visit. ALLERGIES:  No Known Allergies    Social History     Tobacco Use    Smoking status: Never Smoker    Smokeless tobacco: Never Used   Substance Use Topics    Alcohol use: No     Alcohol/week: 0.0 standard drinks      Body mass index is 30.55 kg/m². /66   Pulse 86   Temp 97 °F (36.1 °C)   Ht 5' 5\" (1.651 m)   Wt 183 lb 9.6 oz (83.3 kg)   SpO2 99%   BMI 30.55 kg/m²     Subjective:      HPI    36 yo female coming today for follow-up after she had an MRI of her cervical spine done. The patient states that she has just pain in her upper neck area. She feels heavy movement makes it worse. She does not use any pillows anymore because the pain is still there then.   She has been using naproxen 2 tablets which she says \"brings inflammation down\". She states that massaging this area helps this discomfort. She also states that she has some cracked tips of her fingers and she would like to know if there is an infection going on if she needs to use any additional medication. Review of Systems   Constitutional: Negative for fever and unexpected weight change. Respiratory: Negative for cough and shortness of breath. Cardiovascular: Negative for chest pain and leg swelling. Gastrointestinal: Negative for diarrhea, constipation and blood in stool. Musculoskeletal: Negative for back pain and gait problem. Positive for neck pain. Skin: Negative for color change and rash. Neurological: Negative for dizziness and headaches. Psychiatric/Behavioral: Negative for confusion and agitation. Objective:   Physical Exam  Constitutional: VS (see above). General appearance: normal development, habitus and attention, no deformities. No distress. Eyes: normal conjunctiva and lids. CAV: RRR, no RMG. No edema lower extremities. Pulmo: CTA bilateral, no CWR. Skin: no rashes, lesions or ulcers. Musculoskeletal: normal gait. Nails: no clubbing or cyanosis. Physical Exam  Neck:        Comments: Discomfort with palpitation        Psychiatric: alert and oriented to place, time and person. Normal mood and affect. Assessment:       Diagnosis Orders   1. Neck pain  tiZANidine (ZANAFLEX) 4 MG tablet    Lis Wall MD, Physical Medicine and Rehabilitation, [place]       Plan:   I discussed with patient finding at the MRI but I discussed with her also that the specialist probably will go over the pictures with her. I believe the patient has muscle tension headaches. We will send her to a rehab specialist.  Start Zanaflex at night. Call for any changes. Return in about 6 months (around 5/24/2021), or if symptoms worsen or fail to improve.   Orders Placed This Encounter Tai Beckman MD, Physical Medicine and Rehabilitation, [place]     Referral Priority:   Routine     Referral Type:   Eval and Treat     Referral Reason:   Specialty Services Required     Referred to Provider:   Yuliya Parra MD     Requested Specialty:   Physical Medicine and Rehab     Number of Visits Requested:   1     Orders Placed This Encounter   Medications    tiZANidine (ZANAFLEX) 4 MG tablet     Sig: Take 1 tablet by mouth nightly as needed (msucle relaxans)     Dispense:  7 tablet     Refill:  0       Call or return to clinic prn if these symptoms worsen or fail to improve as anticipated. I have reviewed the instructions with the patient, answering all questions to her satisfaction. Ugo received counseling on the following healthy behaviors: nutrition, exercise and medication adherence  Reviewed prior labs and health maintenance. Continue current medications, diet and exercise. Discussed use, benefit, and side effects of prescribed medications. Barriers to medication compliance addressed. Patient given educational materials - see patient instructions. All patient questions answered. Patient voiced understanding.       Electronically signed by Bryant Cho MD on 11/24/2020 at 9:39 AM       (Please note that portions of this note were completed with a voice recognition program. Efforts were made to edit the dictations but occasionally words are mis-transcribed.)

## 2020-11-24 NOTE — PATIENT INSTRUCTIONS
Patient Education        Tension Headache: Care Instructions  Your Care Instructions  Most headaches are tension headaches. These headaches tend to happen again, especially if you are under stress. A tension headache may cause pain or a feeling of pressure all over your head. You probably can't pinpoint the center of the pain. If you keep getting tension headaches, the best thing you can do to limit them is to find out what is causing them and then make changes in those areas. Follow-up care is a key part of your treatment and safety. Be sure to make and go to all appointments, and call your doctor if you are having problems. It's also a good idea to know your test results and keep a list of the medicines you take. How can you care for yourself at home? · Rest in a quiet, dark room with a cool cloth on your forehead until your headache is gone. Close your eyes, and try to relax or go to sleep. Don't watch TV or read. Avoid using the computer. · Use a warm, moist towel or a heating pad set on low to relax tight shoulder and neck muscles. · Have someone gently massage your neck and shoulders. · Take pain medicines exactly as directed. ? If the doctor gave you a prescription medicine for pain, take it as prescribed. ? If you are not taking a prescription pain medicine, ask your doctor if you can take an over-the-counter medicine. · Be careful not to take pain medicine more often than the instructions allow, because you may get worse or more frequent headaches when the medicine wears off. · If you get another tension headache, stop what you are doing and sit quietly for a moment. Close your eyes and breathe slowly. Try to relax your head and neck muscles. · Do not ignore new symptoms that occur with a headache, such as fever, weakness or numbness, vision changes, or confusion. These may be signs of a more serious problem.   To help prevent headaches  · Keep a headache diary so you can figure out what triggers your headaches. Avoiding triggers may help you prevent headaches. Record when each headache began, how long it lasted, and what the pain was like (throbbing, aching, stabbing, or dull). List anything that may have triggered the headache, such as being physically or emotionally stressed or being anxious or depressed. Other possible triggers are hunger, anger, fatigue, poor posture, and muscle strain. · Find healthy ways to deal with stress. Headaches are most common during or right after stressful times. Take time to relax before and after you do something that has caused a headache in the past.  · Exercise daily to relieve stress. Relaxation exercises may help reduce tension. · Get plenty of sleep. · Eat regularly and well. Long periods without food can trigger a headache. · Treat yourself to a massage. Some people find that massages are very helpful in relieving tension. · Try to keep your muscles relaxed by keeping good posture. Check your jaw, face, neck, and shoulder muscles for tension, and try to relax them. When sitting at a desk, change positions often, and stretch for 30 seconds each hour. · Reduce eyestrain from computers by blinking frequently and looking away from the computer screen every so often. Make sure you have proper eyewear and that your monitor is set up properly, about an arm's length away. When should you call for help? Call 911 anytime you think you may need emergency care. For example, call if:    · You have signs of a stroke. These may include:  ? Sudden numbness, paralysis, or weakness in your face, arm, or leg, especially on only one side of your body. ? Sudden vision changes. ? Sudden trouble speaking. ? Sudden confusion or trouble understanding simple statements. ? Sudden problems with walking or balance. ? A sudden, severe headache that is different from past headaches.    Call your doctor now or seek immediate medical care if:    · You have new or worse nausea and vomiting.     · You have a new or higher fever.     · Your headache gets much worse. Watch closely for changes in your health, and be sure to contact your doctor if:    · You are not getting better after 2 days (48 hours). Where can you learn more? Go to https://[email or web address].[email or web address]. org and sign in to your [employer] account. Enter 52 [phone] in the [employer] box to learn more about \"Tension Headache: Care Instructions. \"     If you do not have an account, please click on the \"Sign Up Now\" link. Current as of: November 20, 2019               Content Version: 12.6  © 2469-2216 [employer]. Care instructions adapted under license by [facility] ([facility]). If you have questions about a medical condition or this instruction, always ask your healthcare professional. [address] any warranty or liability for your use of this information.

## 2020-11-30 ENCOUNTER — PATIENT MESSAGE (OUTPATIENT)
Dept: FAMILY MEDICINE CLINIC | Age: 40
End: 2020-11-30

## 2020-11-30 NOTE — TELEPHONE ENCOUNTER
From: Aaron Florentino  To: Vesta Escalera MD  Sent: 11/30/2020 1:45 PM EST  Subject: Visit Follow-Up Question    I appreciate that but please show me proof it's been changed and edited as a mistake that will be researched and corrected by a certain date. We both know when I come into the office I only get a 15 min. slot and that wouldn't be the right time (or enough time) to find and fix this issue. Please make a point to correct it and allow me to see a noted fix in my chart. I will follow up next week. Thank you for understanding the importance of this.       ----- Message -----   Andres Engle MD   Sent:11/30/2020 1:03 PM EST   To:Ugo Pickett   Subject:RE: Visit Follow-Up Question    I changed the diagnosis in your chart. At your next visit we will check out when this was put in. Thank you for letting me know.      ----- Message -----   From:Ugo Pickett   Sent:11/30/2020 7:16 AM EST   Micah Muniz MD   Subject:Visit Follow-Up Question    See photo. This is totally incorrect and false. It's a huge mistake and must be fixed. I have never had depression and I have never been diagnosed with depression. We have never talked about depression and my answer has always been \"no\" when asked if I was depressed at all. This must be corrected. This isn't a small mistake. Insurance companies don't cover depressed people. They didn't honor my mother's life insurance policy due to her noted depression. This \"word\" should never have been in my chart. It's a big deal to me. If you don't find where the mistake took place I will. I'm very frustrated that the patient has no way to review what's been noted and if it's correct or not. Please find the day it was written and correct the statement. I will follow up in a week. Thanks.

## 2021-02-04 ENCOUNTER — PATIENT MESSAGE (OUTPATIENT)
Dept: FAMILY MEDICINE CLINIC | Age: 41
End: 2021-02-04

## 2021-02-04 NOTE — TELEPHONE ENCOUNTER
From: Lia Connors  To: Alessia Maharaj MD  Sent: 2/4/2021 1:50 PM EST  Subject: Visit Follow-Up Question    Hello! When I saw you last we were looking at my nails and fungus. With tea tree oil daily this seems to be improving very slowly. I wasn't able to get into a dermatologist till April and I checked two places. Nearly two weeks ago I had a rash (burning /painful/ swollen/ itchy) on my full lips. Its was pretty painful constantly even with coconut oil, lanolin to keep moist. It still there but 85% better. About a week ago a rash appeared (raised/ puffy under skin/ itchy) on my arms and legs. There patches and don't seem to be spreading. They seem to be getting better slowly but this last week my ears have been bothering me some and now they seem worse. Pressure and itchy just inside the ear. I notice some soreness and feel as though this rash is in my ear but NOT inner ear like an infection. It's uncomfortable and makes me slightly nervous since it seems to be getting worse not better before heading into the weekend. If ok with you I'd like to have have an order for blood work so I can get in before the weekend. 1) blood work requested:   [address]   [address] [facility], [address],   [address] lipid panel,   vitamin d test,  Anemia Panel (or iron, ferritin, folate, and vitamin B12),   Thyroid Panel (or at least a TSH test) , antibody Covid test ( I believe I had Covid 6wks ago and I'm now regaining taste and smell)    I need to see if I have any vitamin deficiencies. We never found what all my issues were for those six months and with these new symptoms it would nice to see what's going on inside my blood. Appreciate your [facility] help for those request.   Trust you're doing well.      Pictures attached

## 2021-02-08 ENCOUNTER — TELEPHONE (OUTPATIENT)
Dept: FAMILY MEDICINE CLINIC | Age: 41
End: 2021-02-08

## 2021-02-08 DIAGNOSIS — R53.82 CHRONIC FATIGUE: Primary | ICD-10-CM

## 2021-02-08 RX ORDER — ERGOCALCIFEROL 1.25 MG/1
50000 CAPSULE ORAL WEEKLY
Qty: 30 CAPSULE | Refills: 0 | Status: SHIPPED | OUTPATIENT
Start: 2021-02-08 | End: 2021-06-02

## 2021-02-09 ENCOUNTER — TELEPHONE (OUTPATIENT)
Dept: FAMILY MEDICINE CLINIC | Age: 41
End: 2021-02-09

## 2021-02-09 ENCOUNTER — HOSPITAL ENCOUNTER (OUTPATIENT)
Age: 41
Setting detail: SPECIMEN
Discharge: HOME | End: 2021-02-09
Payer: MEDICARE

## 2021-02-09 DIAGNOSIS — R53.82 CHRONIC FATIGUE: ICD-10-CM

## 2021-02-09 DIAGNOSIS — R10.31 ACUTE RIGHT LOWER QUADRANT PAIN: ICD-10-CM

## 2021-02-09 DIAGNOSIS — R53.82 CHRONIC FATIGUE: Primary | ICD-10-CM

## 2021-02-09 LAB
ABSOLUTE EOS #: 0.29 K/UL (ref 0–0.44)
ABSOLUTE IMMATURE GRANULOCYTE: <0.03 K/UL (ref 0–0.3)
ABSOLUTE LYMPH #: 2.56 K/UL (ref 1.1–3.7)
ABSOLUTE MONO #: 0.41 K/UL (ref 0.1–1.2)
ALBUMIN SERPL-MCNC: 4.3 G/DL (ref 3.5–5.2)
ALBUMIN/GLOBULIN RATIO: 1.4 (ref 1–2.5)
ALP BLD-CCNC: 78 U/L (ref 35–104)
ALT SERPL-CCNC: 10 U/L (ref 5–33)
ANION GAP SERPL CALCULATED.3IONS-SCNC: 9 MMOL/L (ref 9–17)
AST SERPL-CCNC: 13 U/L
BASOPHILS # BLD: 1 % (ref 0–2)
BASOPHILS ABSOLUTE: 0.04 K/UL (ref 0–0.2)
BILIRUB SERPL-MCNC: 0.39 MG/DL (ref 0.3–1.2)
BUN BLDV-MCNC: 21 MG/DL (ref 6–20)
BUN/CREAT BLD: ABNORMAL (ref 9–20)
CALCIUM SERPL-MCNC: 9.3 MG/DL (ref 8.6–10.4)
CHLORIDE BLD-SCNC: 105 MMOL/L (ref 98–107)
CHOLESTEROL/HDL RATIO: 2.3
CHOLESTEROL: 150 MG/DL
CO2: 27 MMOL/L (ref 20–31)
CREAT SERPL-MCNC: 0.66 MG/DL (ref 0.5–0.9)
DIFFERENTIAL TYPE: ABNORMAL
EOSINOPHILS RELATIVE PERCENT: 5 % (ref 1–4)
FOLATE: 15.8 NG/ML
GFR AFRICAN AMERICAN: >60 ML/MIN
GFR NON-AFRICAN AMERICAN: >60 ML/MIN
GFR SERPL CREATININE-BSD FRML MDRD: ABNORMAL ML/MIN/{1.73_M2}
GFR SERPL CREATININE-BSD FRML MDRD: ABNORMAL ML/MIN/{1.73_M2}
GLUCOSE BLD-MCNC: 91 MG/DL (ref 70–99)
HCT VFR BLD CALC: 42 % (ref 36.3–47.1)
HDLC SERPL-MCNC: 64 MG/DL
HEMOGLOBIN: 14.2 G/DL (ref 11.9–15.1)
IMMATURE GRANULOCYTES: 0 %
IRON SATURATION: 38 % (ref 20–55)
IRON: 110 UG/DL (ref 37–145)
LDL CHOLESTEROL: 67 MG/DL (ref 0–130)
LYMPHOCYTES # BLD: 47 % (ref 24–43)
MCH RBC QN AUTO: 31.2 PG (ref 25.2–33.5)
MCHC RBC AUTO-ENTMCNC: 33.8 G/DL (ref 28.4–34.8)
MCV RBC AUTO: 92.3 FL (ref 82.6–102.9)
MONOCYTES # BLD: 8 % (ref 3–12)
NRBC AUTOMATED: 0 PER 100 WBC
PDW BLD-RTO: 12 % (ref 11.8–14.4)
PLATELET # BLD: 209 K/UL (ref 138–453)
PLATELET ESTIMATE: ABNORMAL
PMV BLD AUTO: 9.5 FL (ref 8.1–13.5)
POTASSIUM SERPL-SCNC: 3.9 MMOL/L (ref 3.7–5.3)
RBC # BLD: 4.55 M/UL (ref 3.95–5.11)
RBC # BLD: ABNORMAL 10*6/UL
SARS-COV-2 ANTIBODY, TOTAL: POSITIVE
SEG NEUTROPHILS: 39 % (ref 36–65)
SEGMENTED NEUTROPHILS ABSOLUTE COUNT: 2.13 K/UL (ref 1.5–8.1)
SODIUM BLD-SCNC: 141 MMOL/L (ref 135–144)
TOTAL IRON BINDING CAPACITY: 292 UG/DL (ref 250–450)
TOTAL PROTEIN: 7.3 G/DL (ref 6.4–8.3)
TRIGL SERPL-MCNC: 96 MG/DL
TSH SERPL DL<=0.05 MIU/L-ACNC: 1.99 MIU/L (ref 0.3–5)
UNSATURATED IRON BINDING CAPACITY: 182 UG/DL (ref 112–347)
VITAMIN B-12: 705 PG/ML (ref 232–1245)
VITAMIN D 25-HYDROXY: 24.3 NG/ML (ref 30–100)
VLDLC SERPL CALC-MCNC: NORMAL MG/DL (ref 1–30)
WBC # BLD: 5.4 K/UL (ref 3.5–11.3)
WBC # BLD: ABNORMAL 10*3/UL

## 2021-03-26 ENCOUNTER — PATIENT MESSAGE (OUTPATIENT)
Dept: FAMILY MEDICINE CLINIC | Age: 41
End: 2021-03-26

## 2021-04-04 PROBLEM — L60.9 FINGERNAIL ABNORMALITIES: Status: ACTIVE | Noted: 2021-04-04

## 2021-04-04 PROBLEM — E55.9 VITAMIN D INSUFFICIENCY: Status: ACTIVE | Noted: 2021-04-04

## 2021-04-04 PROBLEM — R10.31 RIGHT LOWER QUADRANT ABDOMINAL PAIN: Status: ACTIVE | Noted: 2021-04-04

## 2021-04-04 PROBLEM — U07.1 LAB TEST POSITIVE FOR DETECTION OF COVID-19 VIRUS: Status: ACTIVE | Noted: 2021-04-04

## 2021-04-04 PROBLEM — Z76.89 ESTABLISHING CARE WITH NEW DOCTOR, ENCOUNTER FOR: Status: ACTIVE | Noted: 2021-04-04

## 2021-04-04 PROBLEM — R53.82 CHRONIC FATIGUE: Status: ACTIVE | Noted: 2021-04-04

## 2021-05-18 ENCOUNTER — OFFICE VISIT (OUTPATIENT)
Dept: OBGYN CLINIC | Age: 41
End: 2021-05-18
Payer: MEDICARE

## 2021-05-18 ENCOUNTER — HOSPITAL ENCOUNTER (OUTPATIENT)
Age: 41
Setting detail: SPECIMEN
Discharge: HOME | End: 2021-05-18
Payer: MEDICARE

## 2021-05-18 VITALS
BODY MASS INDEX: 31.49 KG/M2 | SYSTOLIC BLOOD PRESSURE: 114 MMHG | HEIGHT: 65 IN | DIASTOLIC BLOOD PRESSURE: 66 MMHG | WEIGHT: 189 LBS

## 2021-05-18 DIAGNOSIS — Z01.419 ENCOUNTER FOR GYNECOLOGICAL EXAMINATION: Primary | ICD-10-CM

## 2021-05-18 DIAGNOSIS — R10.2 PELVIC PAIN: ICD-10-CM

## 2021-05-18 DIAGNOSIS — Z12.31 ENCOUNTER FOR SCREENING MAMMOGRAM FOR BREAST CANCER: ICD-10-CM

## 2021-05-18 DIAGNOSIS — Z80.0 FAMILY HISTORY OF COLON CANCER REQUIRING SCREENING COLONOSCOPY: ICD-10-CM

## 2021-05-18 PROCEDURE — 99396 PREV VISIT EST AGE 40-64: CPT | Performed by: OBSTETRICS & GYNECOLOGY

## 2021-05-18 ASSESSMENT — ENCOUNTER SYMPTOMS
SHORTNESS OF BREATH: 0
BACK PAIN: 0
ABDOMINAL PAIN: 0
COUGH: 0

## 2021-05-18 NOTE — PROGRESS NOTES
[facility] & [facility] PHYSICIANS  [facility] - [address] [facility]  Dept: [phone]    Chief complaint:   Chief Complaint   Patient presents with    Annual Exam     last pap 9/10/19-WNL HPV- last mammogram 20-WNL        History Present Illness: Zay Lynn is a 42 yo female who presents for her annual exam.  She is continuing to have some numbness with intercourse. She has been having a lot of medical issues in the last year. She is having a lot of cervical neck pain and is seeing neuro for it. She says she is feeling better and a little more herself. She is sexually active with her  and denies any dyspareunia. She has not done her colonoscopy yet, but says she will do one this year since her mom had colon cancer. She denies any vaginal discharge. She denies any bowel or bladder issues. Current Medications (OTC/Herbal):   Current Outpatient Medications   Medication Sig Dispense Refill    vitamin D (ERGOCALCIFEROL) 1.25 MG (11682 UT) CAPS capsule Take 1 capsule by mouth once a week 30 capsule 0     No current facility-administered medications for this visit.      Allergies: No Known Allergies  Past Medical History:   Past Medical History:   Diagnosis Date    Gestational hypertension     Pregnancy 1 & 2     Past Surgical History:   Past Surgical History:   Procedure Laterality Date    TONSILLECTOMY AND ADENOIDECTOMY      WISDOM TOOTH EXTRACTION       Obstetric History:   7  Para 7  Gynecologic History: LMP 19   Menarche 10  Duration 5-7 d    Interval q  30 d  Tampons/Pads in a day: 6-8  Last Pap: 9/10/19       Any history of abnormal paps no    PriorColpo/Biopsy n/a     Last Mammogram hasn't had yet   Contraception: condoms  Complications: none  STDs: none  Psychosocial History: 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   Caffeine Yes    At risk for depression No    Abuse:   No  Seatbelt:   Yes  Exercise:  no    Social History Socioeconomic History    Marital status:      Spouse name: Not on file    Number of children: Not on file    Years of education: Not on file    Highest education level: Not on file   Occupational History    Not on file   Tobacco Use    Smoking status: Never Smoker    Smokeless tobacco: Never Used   Vaping Use    Vaping Use: Never used   Substance and Sexual Activity    Alcohol use: No     Alcohol/week: 0.0 standard drinks    Drug use: No    Sexual activity: Yes     Partners: Male     Birth control/protection: Condom   Other Topics Concern    Not on file   Social History Narrative    Not on file     Social Determinants of Health     Financial Resource Strain: Low Risk     Difficulty of Paying Living Expenses: Not hard at all   Food Insecurity: No Food Insecurity    Worried About [address] [employer] in the Last Year: Never true    [address] in the Last Year: Never true   Transportation Needs: No Transportation Needs    Lack of Transportation (Medical): No    Lack of Transportation (Non-Medical):  No   Physical Activity:     Days of Exercise per Week:     Minutes of Exercise per Session:    Stress:     Feeling of Stress :    Social Connections:     Frequency of Communication with Friends and Family:     Frequency of Social Gatherings with Friends and Family:     Attends Taoist Services:     Active Member of Clubs or Organizations:     Attends Club or Organization Meetings:     Marital Status:    Intimate Partner Violence:     Fear of Current or Ex-Partner:     Emotionally Abused:     Physically Abused:     Sexually Abused:        Family History   Problem Relation Age of Onset    Diabetes Maternal Grandmother     Breast Cancer Maternal Grandmother         unsure of age   Jaylene Brunner Diabetes Maternal Grandfather     Colon Cancer Mother 48    Cervical Cancer Mother     Cancer Sister         hodgkins    Hypertension Father        Review of Systems:   Review of Systems current guidelines. Mammograms every 1year. If 35 yo and last mammogram was negative. Pt desired repeat Pap even though normal with neg HPV in 2019. Referral to PT for weak pelvic muscles and pain. Calcium and Vitamin D dosing reviewed. Colonoscopy screening reviewed as well as onset for bone density testing. Birth control and barrier recommendations discussed. STD counseling and prevention reviewed. Routine health maintenance per patients PCP.   Pt to follow up for annual exam in 1 year    Raghav Aguiar MD  [address]

## 2021-05-20 LAB — CYTOLOGY REPORT: NORMAL

## 2021-05-21 DIAGNOSIS — Z01.419 ENCOUNTER FOR GYNECOLOGICAL EXAMINATION: Primary | ICD-10-CM

## 2021-06-02 ENCOUNTER — HOSPITAL ENCOUNTER (OUTPATIENT)
Age: 41
Setting detail: SPECIMEN
Discharge: HOME | End: 2021-06-02
Payer: MEDICARE

## 2021-06-02 ENCOUNTER — OFFICE VISIT (OUTPATIENT)
Dept: DERMATOLOGY | Age: 41
End: 2021-06-02
Payer: MEDICARE

## 2021-06-02 VITALS
WEIGHT: 190.4 LBS | HEART RATE: 87 BPM | OXYGEN SATURATION: 98 % | HEIGHT: 65 IN | SYSTOLIC BLOOD PRESSURE: 115 MMHG | BODY MASS INDEX: 31.72 KG/M2 | TEMPERATURE: 97 F | DIASTOLIC BLOOD PRESSURE: 65 MMHG

## 2021-06-02 DIAGNOSIS — L60.1 ONYCHOLYSIS: Primary | ICD-10-CM

## 2021-06-02 DIAGNOSIS — D22.5 ATYPICAL NEVUS OF BACK: ICD-10-CM

## 2021-06-02 PROCEDURE — G8417 CALC BMI ABV UP PARAM F/U: HCPCS | Performed by: DERMATOLOGY

## 2021-06-02 PROCEDURE — 1036F TOBACCO NON-USER: CPT | Performed by: DERMATOLOGY

## 2021-06-02 PROCEDURE — 99214 OFFICE O/P EST MOD 30 MIN: CPT | Performed by: DERMATOLOGY

## 2021-06-02 PROCEDURE — G8427 DOCREV CUR MEDS BY ELIG CLIN: HCPCS | Performed by: DERMATOLOGY

## 2021-06-02 NOTE — PROGRESS NOTES
Dermatology Patient Note  [address]  [address]  [address] [place] [address]  Dept: [phone]  Dept Fax: [phone]      VISITDATE: 6/2/2021   REFERRING PROVIDER: No ref. provider found      Nahun Ruano is a 41 y.o. female  who presents today in the office for:    Follow-up (fungus on the fingernails. 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 ALLERGIES:   No Known Allergies    SOCIAL HISTORY:  Social History     Tobacco Use    Smoking status: Never Smoker    Smokeless tobacco: Never Used   Substance Use Topics    Alcohol use: No     Alcohol/week: 0.0 standard drinks       Pertinent ROS:  Review of Systems  Skin: Denies any new changing, growing or bleeding lesions or rashes except as described in the HPI   Constitutional: Denies fevers, chills, and malaise.     PHYSICAL EXAM:   /65 (Site: Left Upper Arm, Position: Sitting, Cuff Size: Medium Adult)   Pulse 87   Temp 97 °F (36.1 °C)   Ht 5' 5\" (1.651 m)   Wt 190 lb 6.4 oz (86.4 kg)   LMP 05/26/2021   SpO2 98%   BMI 31.68 kg/m²     The patient is generally well appearing, well nourished, alert and conversational. Affect is normal.    Cutaneous

## 2021-06-08 DIAGNOSIS — L60.1 ONYCHOLYSIS: Primary | ICD-10-CM

## 2021-06-08 LAB — DERMATOLOGY PATHOLOGY REPORT: NORMAL

## 2021-06-08 RX ORDER — TRIAMCINOLONE ACETONIDE 1 MG/G
CREAM TOPICAL
Qty: 30 G | Refills: 2 | Status: SHIPPED | OUTPATIENT
Start: 2021-06-08

## 2021-06-08 RX ORDER — KETOCONAZOLE 20 MG/G
CREAM TOPICAL
Qty: 30 G | Refills: 3 | Status: SHIPPED | OUTPATIENT
Start: 2021-06-08

## 2021-06-11 ENCOUNTER — PATIENT MESSAGE (OUTPATIENT)
Dept: FAMILY MEDICINE CLINIC | Age: 41
End: 2021-06-11

## 2021-06-11 NOTE — TELEPHONE ENCOUNTER
From: Abi Vazquez  To: Veena Mckenzie MD  Sent: 6/11/2021 8:08 AM EDT  Subject: Prescription Question    Good morning. Im not sure why we haven't heard back this week about my request. I will set an appt up but I'm pretty close to running out of time on this request. Lex Cota () and I both need this test run. Thank you.

## 2021-06-14 NOTE — TELEPHONE ENCOUNTER
Called patient, no answer. Left message to contact the office to inform patient she will need to follow up with current pcp. Patient is no longer a patient of Dr. Duglas Rowell.

## 2021-06-18 ENCOUNTER — HOSPITAL ENCOUNTER (OUTPATIENT)
Dept: PHYSICAL THERAPY | Facility: CLINIC | Age: 41
Setting detail: THERAPIES SERIES
Discharge: HOME | End: 2021-06-18
Payer: MEDICARE
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
## 2021-07-15 ENCOUNTER — PATIENT MESSAGE (OUTPATIENT)
Dept: FAMILY MEDICINE CLINIC | Age: 41
End: 2021-07-15

## 2021-07-15 NOTE — TELEPHONE ENCOUNTER
From: Jeannette Lr  To: Kim Justin MD  Sent: 7/15/2021 6:37 AM EDT  Subject: Non-Urgent Medical Question    Good morning. I have been out of state and wanted to follow up. The office returned my calls with little time to do anything before leaving. That is why you didn't hear back from me. I'm not sure why you and your team decided I was no longer a patient of yours after receiving a second opinion and after searching for answers with you of my condition for nearly all of 2020 and into 2021. My health insurance communicated to me that I'm able to visit doctors for second opinions without worry of losing current/primary PCP even if the visiting doctor requires medical card to have their name printed on card. Insurance said some doctors require that and I'm able to change the name as needed for visit. This apparently isn't true with your office. Sadly, after all these years it's been made very clear to me that your not interested in continuing in my care. I will go ahead and look for new primary care. To my understanding you are still my current PCP and my PCP for our whole family (9). I didn't realize I'd be denied care and removed from your office for going to second opinion on my health.

## 2021-07-27 ENCOUNTER — PROCEDURE VISIT (OUTPATIENT)
Dept: DERMATOLOGY | Age: 41
End: 2021-07-27
Payer: MEDICARE

## 2021-07-27 ENCOUNTER — HOSPITAL ENCOUNTER (OUTPATIENT)
Age: 41
Setting detail: SPECIMEN
Discharge: HOME | End: 2021-07-27
Payer: MEDICARE

## 2021-07-27 VITALS
OXYGEN SATURATION: 97 % | SYSTOLIC BLOOD PRESSURE: 108 MMHG | TEMPERATURE: 97.2 F | DIASTOLIC BLOOD PRESSURE: 73 MMHG | HEART RATE: 87 BPM

## 2021-07-27 DIAGNOSIS — D22.5 ATYPICAL NEVUS OF BACK: Primary | ICD-10-CM

## 2021-07-27 PROCEDURE — 12032 INTMD RPR S/A/T/EXT 2.6-7.5: CPT | Performed by: DERMATOLOGY

## 2021-07-27 PROCEDURE — 11402 EXC TR-EXT B9+MARG 1.1-2 CM: CPT | Performed by: DERMATOLOGY

## 2021-07-27 RX ORDER — LIDOCAINE HYDROCHLORIDE AND EPINEPHRINE 10; 10 MG/ML; UG/ML
15 INJECTION, SOLUTION INFILTRATION; PERINEURAL ONCE
Status: COMPLETED | OUTPATIENT
Start: 2021-07-27 | End: 2021-07-27

## 2021-07-27 RX ADMIN — LIDOCAINE HYDROCHLORIDE AND EPINEPHRINE 15 ML: 10; 10 INJECTION, SOLUTION INFILTRATION; PERINEURAL at 14:02

## 2021-07-27 NOTE — PATIENT INSTRUCTIONS
Post-operative Instructions for Excision Surgery  1. Wash hands with antibacterial soap before beginning wound care. Remove the pressure bandage the morning after surgery. Apply Vaseline ointment over the counter with a Q-tip and/or gauze; cover with a clean band-aid 2-3 times daily UNTIL SUTURES OR STAPLES ARE REMOVED. The surgical area can get wet, unless otherwise directed. We recommend that you avoid direct water pressure on the surgical site during the healing process. Important: Keep the area moist with the ointment and covered at all times. Keeping the surgical site moist and covered will help to prevent scab formation. 2.  Wipe area with hydrogen peroxide on a Q-tip twice a day before applying ointment. 3.  For 2 days following surgery: No smoking, no ingestion of alcohol, no aspirin (unless prescribed as part of a cardiac or stroke regimen)  or NSAIDS (no Advil, Motrin, Aleve, Ibuprofen). Take only Tylenol (acetaminophen) for discomfort. Any blood-thinner or anticoagulation medication you regularly take should be continued at your prescribing doctors discretion. 4.  For facial and scalp surgery: Do not bend over for 2 days and use 2 pillows to sleep for 2 nights. 5. Please note: Swelling and/or bruising may be visible below the surgical site due to gravitys pull. This may be more evident after facial procedures. Some very mild drainage onto the band-aid may be a normal part of wound healing a few days after surgery. 6. Do not do any heavy lifting or workout exercises UNTIL SUTURES OR STAPLES ARE REMOVED. No sit-ups, jogging, running, free-weight lifting, swimming, racquet ball, tennis, aerobics, golf, bowling, or speed-walking. You may walk at a normal pace. You may resume these activities after your suture/staple removal appointment. 7.  If the procedure is in an area where you shave, do not shave within 2 inches of the surgical site.     8. OWEN/Compression stocking instructions: If a OWEN/Compression stocking is applied, leave it in place until the morning. Following the dressing change, reapply the stocking and leave it on during the day. Remove the stocking at bedtime. Continue to wear the stocking until the sutures are removed. 9. During your surgery, your surgeon may have used an underlying layer of sutures, which are normally absorbed by the body. In some instances, the suture material may not be absorbed and you may need to come back for a follow-up visit. If you notice an acne-like bump or pimple forming along or in the suture line, as late as 2-8 weeks after your surgery, it may be the absorbable suture not being absorbed, and we would like you to call the office and make an appointment. An additional result of the procedure may be a slight raising or puckering at the edges of the surgical area due to overzealous healing at the surgery site. This may occur several weeks after the sutures have been removed. If you notice this, please call our office and make an appointment.        Additional comments or prescriptions:_____________________________________________________  Suture removal appointment Date & Time__________________________________________________  For emergencies, please call the office

## 2021-07-27 NOTE — PROGRESS NOTES
Dermatology Procedure Note   [facility] PHYSICIANS  [facility]  [address]  [facility] [address]  Dept: [phone]  Dept Fax: [phone]      Procedure Date: 7/27/2021  Procedure Time: 9:02 AM    Procedure Practitioner: Sandrine Parra MD    Procedure: Excision    Pre-Procedure Diagnosis: Atypical Nevus    Post-Procedure Diagnosis: Same as Pre-Procedure Diagnosis    Informed Consent: The procedure and its risks were explained including but not limited to pain, bleeding, infection, permanent scar, permanent pigment alteration and recurrence. Consent to proceed with the procedure was obtained from the patient or the parent by the practitioner    Time Out:  A time out was conducted immediately before starting the procedure that confirmed a final verification of the correct patient, correct procedure, and correct site. Procedure Details:  Excision and layered closure: The 8 mm lesion on the mid back was cleaned with chlorhexidine and anesthetized with 1% lidocaine with epinephrine. The lesion was then excised in an elliptical fashion down to subcutaneous fat with a 2 mm margin for a total excised diameter of 12 mm. Hemostasis was then achieved with electrocautery. Due to tension on the defect, undermining was performed to allow for closure. The subcutaneous tissues were then brought together with 3-0 Vicryl. The epidermis was approximated with 4-0 Prolene. running sutures. Final layered closure was 40 mm. Vaseline and a bulky wound dressing was applied.     Procedure Performed By: Sandrine Parra MD    Estimated Blood Loss: Minimal    Pathologic Specimen: H&E    Procedure Tolerance: Good    Complication(s): None    Electronically signed by Sandrine Parra MD on 7/27/21 at 9:02 AM EDT

## 2021-07-27 NOTE — PROGRESS NOTES
Dermatology Surgery Pre-Visit Checklist    (Please complete with  Y (yes) / N (no) or explain)    Pathologic Diagnosis: atypical mole    Photo available of surgery site in media: yes    Competent to give informed consent?  yes   If not, who is authorized to do so: n    Need for help for wound care: NO   If so, who will do so: NA    Are you diagnosed:   Diabetes: NO  If yes, last A1C: NA       HIV: NO       Hepatitis: NO       Current smoker: NO  If yes, how much: NA    So you have any of the following: Pacemaker: NO       Defibrillator: NO       Artificial Heart valve: NO                Artificial Joints: NO       Other Implantable Device: NO       Organ Transplant: NO       Other: NO    Are you on blood thinners such as: Asprin: NO       Warfarin/Coumadin: NO       Other: NO    Any allergies to the following:  Lidocaine: NO       Iodine: NO       Adhesive: NO       Other: NO

## 2021-07-30 LAB — DERMATOLOGY PATHOLOGY REPORT: NORMAL

## 2021-08-02 NOTE — RESULT ENCOUNTER NOTE
Please inform patient that the atypical mole was completely removed. Let me know if he/she has any questions or concerns about healing.

## 2021-08-10 ENCOUNTER — NURSE ONLY (OUTPATIENT)
Dept: DERMATOLOGY | Age: 41
End: 2021-08-10

## 2021-08-10 DIAGNOSIS — Z48.02 VISIT FOR SUTURE REMOVAL: Primary | ICD-10-CM

## 2021-08-10 PROCEDURE — 99024 POSTOP FOLLOW-UP VISIT: CPT | Performed by: DERMATOLOGY

## 2021-08-10 NOTE — PROGRESS NOTES
Vega Cantu presented for suture removal on the back. The biopsy site was well healed. The suture was removed and a band-aid applied. The patient left in good condition.

## 2022-03-02 ENCOUNTER — TELEPHONE (OUTPATIENT)
Dept: DERMATOLOGY | Age: 42
End: 2022-03-02

## 2022-10-28 ENCOUNTER — HOSPITAL ENCOUNTER (OUTPATIENT)
Age: 42
Discharge: HOME | End: 2022-10-28
Payer: MEDICARE

## 2022-10-28 LAB
GLUCOSE ADMINISTRATION: ABNORMAL
GLUCOSE TOLERANCE SCREEN 50G: 164 MG/DL (ref 70–135)

## 2022-10-28 PROCEDURE — 82950 GLUCOSE TEST: CPT

## 2022-10-28 PROCEDURE — 36415 COLL VENOUS BLD VENIPUNCTURE: CPT

## 2022-11-02 ENCOUNTER — HOSPITAL ENCOUNTER (OUTPATIENT)
Age: 42
Discharge: HOME | End: 2022-11-02
Payer: MEDICARE

## 2022-11-02 LAB
3 HR GLUCOSE: 141 MG/DL (ref 65–139)
AMOUNT GLUCOSE GIVEN: 100 G
GLUCOSE FASTING: 83 MG/DL (ref 65–94)
GLUCOSE TOLERANCE TEST 1 HOUR: 207 MG/DL (ref 65–179)
GLUCOSE TOLERANCE TEST 2 HOUR: 206 MG/DL (ref 65–154)

## 2022-11-02 PROCEDURE — 36415 COLL VENOUS BLD VENIPUNCTURE: CPT

## 2022-11-02 PROCEDURE — 82952 GTT-ADDED SAMPLES: CPT

## 2022-11-02 PROCEDURE — 82951 GLUCOSE TOLERANCE TEST (GTT): CPT

## 2022-11-04 ENCOUNTER — TELEPHONE (OUTPATIENT)
Dept: OBGYN CLINIC | Age: 42
End: 2022-11-04

## 2022-11-04 NOTE — TELEPHONE ENCOUNTER
Pt called asking to be transferred to our office because she wants to deliver at [address] and a msg ws left to have pt call and schedule.

## 2022-11-11 NOTE — PROGRESS NOTES
SUBJECTIVE:  Martir Hanley is here for her new OB visit. She reports she desires to be with a midwife for this delivery and has heard good things about the practices. She reports she has had 7 previous vaginal deliveries without any complications during delivery. She reports she has never been labeled high risk in a previous pregnancy. She reports she has been checking her blood sugars due to having an elevated 3 hour glucose. She declines any other questions or concerns at this time. She reports feeling fetal movement. She denies vaginal bleeding. She denies vaginal discharge. She denies leaking of fluid. She denies uterine cramping. She denies  nausea and/or vomiting. OBJECTIVE:  Blood pressure 112/64, weight 201 lb (91.2 kg), not currently breastfeeding. Martir Hanley has not received the flu vaccine as appropriate. Martir Hanley has not received the Tdap vaccine as appropriate  Martir Hanley has not received the COVID vaccine as appropriate    ASSESSMENT/PLAN:  1. High-risk pregnancy in third trimester  IUP @ 33 weeks  S = D    2. 33 weeks gestation of pregnancy  Due date is based on 8w5d early dating ultrasound  Patient's prenatal labs are completed. Patient's blood type B positive and rhogam is not indicated in pregnancy. Patient accepted genetic screening at previous providers office and reports it was WNL. Anatomy scan completed at previous providers office and WNL. Glucola completed between 24-28 weeks and elevated at 164. 3 hour completed and failed. Reviewed fetal movement  Reviewed signs and symptoms of labor  Discussed signs and symptoms of preeclampsia    3. Antepartum multigravida of advanced maternal age  - Genetic screening previously completed and WNL    4. Diet controlled gestational diabetes mellitus (GDM) in third trimester  - Blood sugar log tracked on patient phone reviewed and all WNL - controlled with diet.    - Patient given paper log and discussed blood sugar goals and bringing in for review at next visit. - M referral placed for GDM. She was counseled regarding all of the above:    Return in about 2 weeks (around 11/29/2022) for return OB. The patient, Madeline Calderón was seen with a total time spent of 15 minutes for the visit on this date of service by the [facility]  Both face-to-face (counseling and education) and non face-to-face time (care coordination), were spent in determining the total time component.      Electronically Signed By: RAH Duffy CNM

## 2022-11-14 ASSESSMENT — SOCIAL DETERMINANTS OF HEALTH (SDOH)
WITHIN THE LAST YEAR, HAVE YOU BEEN KICKED, HIT, SLAPPED, OR OTHERWISE PHYSICALLY HURT BY YOUR PARTNER OR EX-PARTNER?: NO
WITHIN THE LAST YEAR, HAVE YOU BEEN AFRAID OF YOUR PARTNER OR EX-PARTNER?: NO
WITHIN THE LAST YEAR, HAVE YOU BEEN HUMILIATED OR EMOTIONALLY ABUSED IN OTHER WAYS BY YOUR PARTNER OR EX-PARTNER?: NO
WITHIN THE LAST YEAR, HAVE TO BEEN RAPED OR FORCED TO HAVE ANY KIND OF SEXUAL ACTIVITY BY YOUR PARTNER OR EX-PARTNER?: NO

## 2022-11-15 ENCOUNTER — TELEPHONE (OUTPATIENT)
Dept: OBGYN CLINIC | Age: 42
End: 2022-11-15

## 2022-11-15 ENCOUNTER — INITIAL PRENATAL (OUTPATIENT)
Dept: OBGYN CLINIC | Age: 42
End: 2022-11-15
Payer: MEDICARE

## 2022-11-15 VITALS — BODY MASS INDEX: 33.45 KG/M2 | DIASTOLIC BLOOD PRESSURE: 64 MMHG | WEIGHT: 201 LBS | SYSTOLIC BLOOD PRESSURE: 112 MMHG

## 2022-11-15 DIAGNOSIS — O09.93 HIGH-RISK PREGNANCY IN THIRD TRIMESTER: Primary | ICD-10-CM

## 2022-11-15 DIAGNOSIS — O09.529 ANTEPARTUM MULTIGRAVIDA OF ADVANCED MATERNAL AGE: ICD-10-CM

## 2022-11-15 DIAGNOSIS — O24.410 DIET CONTROLLED GESTATIONAL DIABETES MELLITUS (GDM) IN THIRD TRIMESTER: ICD-10-CM

## 2022-11-15 DIAGNOSIS — Z3A.33 33 WEEKS GESTATION OF PREGNANCY: ICD-10-CM

## 2022-11-15 PROBLEM — R73.09 ABNORMAL GLUCOSE: Status: ACTIVE | Noted: 2022-11-15

## 2022-11-15 PROCEDURE — 99212 OFFICE O/P EST SF 10 MIN: CPT

## 2022-11-15 NOTE — PROGRESS NOTES
Pt is here today at her 33w6 prenatal visit  Pt states fetal movement is present  Pt has no concerns

## 2022-11-17 ENCOUNTER — TELEPHONE (OUTPATIENT)
Dept: PERINATAL CARE | Age: 42
End: 2022-11-17

## 2022-11-17 DIAGNOSIS — O24.410 DIET CONTROLLED GESTATIONAL DIABETES MELLITUS (GDM) IN THIRD TRIMESTER: Primary | ICD-10-CM

## 2022-11-17 NOTE — TELEPHONE ENCOUNTER
Referral received from [facility] for diabetiic consult.   Pt notified and asked to have testing supplies called into [facility] on [address] called into above pharmacy, [facility], [phone], glucometer, with lancets, test strips and alcohol pads, to monitor blood sugars 4x's/day, FBS and 2hrs after each meal. One month supply and 1 refill

## 2022-11-30 ENCOUNTER — ROUTINE PRENATAL (OUTPATIENT)
Dept: PERINATAL CARE | Age: 42
End: 2022-11-30
Payer: MEDICARE

## 2022-11-30 VITALS
SYSTOLIC BLOOD PRESSURE: 116 MMHG | RESPIRATION RATE: 16 BRPM | TEMPERATURE: 97.6 F | WEIGHT: 201 LBS | HEART RATE: 92 BPM | BODY MASS INDEX: 33.49 KG/M2 | HEIGHT: 65 IN | DIASTOLIC BLOOD PRESSURE: 77 MMHG

## 2022-11-30 DIAGNOSIS — O35.2XX0 HEREDITARY FAMILIAL DISEASE AFFECTING MANAGEMENT OF MOTHER AND POSSIBLY AFFECTING FETUS, ANTEPARTUM, SINGLE OR UNSPECIFIED FETUS: ICD-10-CM

## 2022-11-30 DIAGNOSIS — O09.299 CURRENT SINGLETON PREGNANCY WITH HISTORY OF CONGENITAL ANOMALY IN PRIOR CHILD, ANTEPARTUM: ICD-10-CM

## 2022-11-30 DIAGNOSIS — Z3A.36 36 WEEKS GESTATION OF PREGNANCY: ICD-10-CM

## 2022-11-30 DIAGNOSIS — O24.410 DIET CONTROLLED GESTATIONAL DIABETES MELLITUS (GDM), ANTEPARTUM: ICD-10-CM

## 2022-11-30 DIAGNOSIS — O09.523 MULTIGRAVIDA OF ADVANCED MATERNAL AGE IN THIRD TRIMESTER: Primary | ICD-10-CM

## 2022-11-30 LAB
ABDOMINAL CIRCUMFERENCE: NORMAL
BIPARIETAL DIAMETER: NORMAL
ESTIMATED FETAL WEIGHT: NORMAL
FEMORAL DIAMETER: NORMAL
HC/AC: NORMAL
HEAD CIRCUMFERENCE: NORMAL

## 2022-11-30 PROCEDURE — 76811 OB US DETAILED SNGL FETUS: CPT | Performed by: OBSTETRICS & GYNECOLOGY

## 2022-11-30 PROCEDURE — 99243 OFF/OP CNSLTJ NEW/EST LOW 30: CPT | Performed by: OBSTETRICS & GYNECOLOGY

## 2022-11-30 PROCEDURE — G8419 CALC BMI OUT NRM PARAM NOF/U: HCPCS | Performed by: OBSTETRICS & GYNECOLOGY

## 2022-11-30 PROCEDURE — 76819 FETAL BIOPHYS PROFIL W/O NST: CPT | Performed by: OBSTETRICS & GYNECOLOGY

## 2022-11-30 PROCEDURE — G8427 DOCREV CUR MEDS BY ELIG CLIN: HCPCS | Performed by: OBSTETRICS & GYNECOLOGY

## 2022-11-30 PROCEDURE — G8484 FLU IMMUNIZE NO ADMIN: HCPCS | Performed by: OBSTETRICS & GYNECOLOGY

## 2022-11-30 RX ORDER — ASPIRIN 81 MG
TABLET, DELAYED RELEASE (ENTERIC COATED) ORAL
COMMUNITY

## 2022-12-01 PROBLEM — Z64.1 GRAND MULTIPARA: Status: ACTIVE | Noted: 2022-12-01

## 2022-12-01 PROBLEM — O09.529 ANTEPARTUM MULTIGRAVIDA OF ADVANCED MATERNAL AGE: Status: RESOLVED | Noted: 2022-11-15 | Resolved: 2022-12-01

## 2022-12-01 PROBLEM — L40.9 PSORIASIS: Status: ACTIVE | Noted: 2022-12-01

## 2022-12-01 PROBLEM — R10.31 RIGHT LOWER QUADRANT ABDOMINAL PAIN: Status: RESOLVED | Noted: 2021-04-04 | Resolved: 2022-12-01

## 2022-12-01 PROBLEM — O09.43 HIGH RISK MULTIGRAVIDA IN THIRD TRIMESTER: Status: ACTIVE | Noted: 2022-12-01

## 2022-12-01 PROBLEM — R10.2 PELVIC PRESSURE IN FEMALE: Status: RESOLVED | Noted: 2018-05-31 | Resolved: 2022-12-01

## 2022-12-01 PROBLEM — Z76.89 ESTABLISHING CARE WITH NEW DOCTOR, ENCOUNTER FOR: Status: RESOLVED | Noted: 2021-04-04 | Resolved: 2022-12-01

## 2022-12-01 PROBLEM — N81.89 LOSS OF PERINEAL BODY, FEMALE: Status: RESOLVED | Noted: 2018-05-31 | Resolved: 2022-12-01

## 2022-12-01 PROBLEM — O09.43 HIGH RISK MULTIGRAVIDA IN THIRD TRIMESTER: Chronic | Status: ACTIVE | Noted: 2022-12-01

## 2022-12-05 PROBLEM — Z87.59 HISTORY OF GESTATIONAL HYPERTENSION: Status: ACTIVE | Noted: 2022-12-05

## 2022-12-05 PROBLEM — U07.1 LAB TEST POSITIVE FOR DETECTION OF COVID-19 VIRUS: Status: RESOLVED | Noted: 2021-04-04 | Resolved: 2022-12-05

## 2022-12-06 ENCOUNTER — ROUTINE PRENATAL (OUTPATIENT)
Dept: OBGYN CLINIC | Age: 42
End: 2022-12-06
Payer: MEDICARE

## 2022-12-06 ENCOUNTER — HOSPITAL ENCOUNTER (OUTPATIENT)
Age: 42
Setting detail: SPECIMEN
Discharge: HOME | End: 2022-12-06

## 2022-12-06 VITALS
WEIGHT: 205 LBS | SYSTOLIC BLOOD PRESSURE: 108 MMHG | BODY MASS INDEX: 34.11 KG/M2 | DIASTOLIC BLOOD PRESSURE: 74 MMHG | HEART RATE: 98 BPM

## 2022-12-06 DIAGNOSIS — O09.523 MULTIGRAVIDA OF ADVANCED MATERNAL AGE IN THIRD TRIMESTER: Chronic | ICD-10-CM

## 2022-12-06 DIAGNOSIS — R73.09 ABNORMAL GLUCOSE: ICD-10-CM

## 2022-12-06 DIAGNOSIS — O09.90 HIGH RISK PREGNANCY, ANTEPARTUM: Primary | ICD-10-CM

## 2022-12-06 DIAGNOSIS — O24.410 DIET CONTROLLED GESTATIONAL DIABETES MELLITUS (GDM) IN THIRD TRIMESTER: ICD-10-CM

## 2022-12-06 DIAGNOSIS — Z87.59 HISTORY OF GESTATIONAL HYPERTENSION: ICD-10-CM

## 2022-12-06 DIAGNOSIS — Z64.1 GRAND MULTIPARA: ICD-10-CM

## 2022-12-06 DIAGNOSIS — O09.90 HIGH RISK PREGNANCY, ANTEPARTUM: ICD-10-CM

## 2022-12-06 DIAGNOSIS — O09.43 HIGH RISK MULTIGRAVIDA IN THIRD TRIMESTER: Chronic | ICD-10-CM

## 2022-12-06 PROCEDURE — G8427 DOCREV CUR MEDS BY ELIG CLIN: HCPCS | Performed by: ADVANCED PRACTICE MIDWIFE

## 2022-12-06 PROCEDURE — 1036F TOBACCO NON-USER: CPT | Performed by: ADVANCED PRACTICE MIDWIFE

## 2022-12-06 PROCEDURE — G8419 CALC BMI OUT NRM PARAM NOF/U: HCPCS | Performed by: ADVANCED PRACTICE MIDWIFE

## 2022-12-06 PROCEDURE — 99214 OFFICE O/P EST MOD 30 MIN: CPT | Performed by: ADVANCED PRACTICE MIDWIFE

## 2022-12-06 PROCEDURE — G8484 FLU IMMUNIZE NO ADMIN: HCPCS | Performed by: ADVANCED PRACTICE MIDWIFE

## 2022-12-06 NOTE — PROGRESS NOTES
[facility]  [address] [place] [address]  [place] [id number]  Dept: [phone]      Patient Name: Shelbie Tucker  Patient : 1980  MRN #: [id number]  [facility] #: [id number]    Date: 2022  Time: 8:50 AM  Chief Complaint   Patient presents with    Routine Prenatal Visit     No LMP recorded. Patient is pregnant. SUBJECTIVE:    Zoila Cheek is here for her return OB visit. She is 36w6d weeks pregnant. She reports  feeling fetal movement. She denies vaginal bleeding, vaginal discharge, leaking of fluid. She reports increased cervical mucus. She denies uterine cramping. She denies  nausea and/or vomiting. She denies HA, visual changes, edema, or RUQ pain. Blood sugars- good She reports \"Dr. Mayda Lowry states she does not need to submit her sugar logs any longer\". She is still checking her blood sugar occasionally and reports they have been normal.     She denies any hx of hypertension during her previous pregnancies. She does desire her Hgb to be rechecked today.        OB History    Para Term  AB Living   8 7 7     7   SAB IAB Ectopic Molar Multiple Live Births             7      # Outcome Date GA Lbr Ben/2nd Weight Sex Delivery Anes PTL Lv   8 Current            7 Term 16 40w2d   F Vag-Spont   ALIN   6 Term 13 38w0d   M Vag-Spont None N ALIN   5 Term 11   9 lb (4.082 kg) F Vag-Spont None N ALIN   4 Term 09   8 lb (3.629 kg) M Vag-Spont EPI N ALIN   3 Term 06 38w0d  8 lb (3.629 kg) M Vag-Spont EPI  ALIN      Complications: Hypospadias   2 Term 04 38w0d  8 lb (3.629 kg) M Vag-Spont EPI N ALIN   1 Term 01 38w0d  8 lb (3.629 kg) M Vag-Spont  N ALIN      Birth Comments: Length of Labor- 12     Past Medical History:   Diagnosis Date    Gestational hypertension     Pregnancy 1 & 2     Past Surgical History:   Procedure Laterality Date    TONSILLECTOMY AND ADENOIDECTOMY      WISDOM TOOTH EXTRACTION       Current Outpatient Medications   Medication Sig Dispense Refill    Docusate Sodium 100 MG TABS Take by mouth      blood glucose monitor kit and supplies Dispense sufficient amount for indicated testing frequency plus additional to accommodate PRN testing needs. Dispense all needed supplies to include: monitor, strips, lancing device, lancets, control solutions, alcohol swabs. 1 kit 0    Prenatal MV-Min-Fe Fum-FA-DHA (PRENATAL 1 PO) Take by mouth      vitamin D (ERGOCALCIFEROL) 1.25 MG (27382 UT) CAPS capsule Take 1 capsule by mouth once a week 30 capsule 0     No current facility-administered medications for this visit. No Known Allergies    OBJECTIVE:  /74   Pulse 98   Wt 205 lb (93 kg)   BMI 34.11 kg/m²   Wt Readings from Last 3 Encounters:   12/06/22 205 lb (93 kg)   11/30/22 201 lb (91.2 kg)   11/15/22 201 lb (91.2 kg)     Body mass index is 34.11 kg/m². Total weight gain: 15 lb (6.804 kg)      ASSESSMENT/PLAN:  IUP 36w5d weeks  [id number]    Pregnancy  Due date is based on early dating ultrasound  Patient's prenatal labs are completed. Patient's blood type is B+ and rhogam is not indicated in pregnancy. Gc/ct-   urine culture-  UDS - neg  Hep B- NR  Hep C- NR  HIV- NR  H/H/P:12.0/33.8  Rubella-immune  T. Pallidum, IgG- NR   Varicella-     Anatomy scan: Placenta posterior, normal cord insertion, no low lying/previa noted. Anatomy WNL, Follow up PRN. Glucola 164 between 24-28 weeks. 3hr GTT 83/207/206/141  H/H/P- 12.0/33.8  T.  Pallidium - NR            Transfer of care @ 33 wk:  B positive/negative  UDS: Negative  Hepatitis C: Negative  Hepatitis B: Negative  HIV: Negative  Syphillis: NR  Rubella: Immune  CF: Negative  Electrophoresis: Negative  H/H: 12.0/33.8  Glucola: 164  3 hr GTT: (ABN) 83/207/206/141     Patient Active Problem List    Diagnosis Date Noted    High risk multigravida in third trimester 12/01/2022     Priority: High     Overview Note:     Transfer of care @ 33 wk ([place]):    Chart sent for review to Dr Vicki Rosa: (12/1)  Reviewed by Kelly García DO 12/1/22       Advanced maternal age in multigravida 11/04/2015     Priority: High     Overview Note:     NIPT: Negative    Induction at 44 week:      History of gestational hypertension (G1) 12/05/2022     Priority: Medium     Overview Note:     Baseline labs: declined 12/6/22      Psoriasis 12/01/2022     Priority: Medium    Grand multipara 12/01/2022     Priority: Medium    Diet controlled gestational diabetes mellitus (GDM) in third trimester 11/15/2022     Priority: Medium     Overview Note:     Induction 44- 40 6/7 wk:      Abnormal glucose> GDM 11/15/2022     Overview Note:     1 hr 169  3 hour: elevated      Fingernail abnormalities 04/04/2021    Chronic fatigue 04/04/2021    Vitamin D insufficiency 04/04/2021    Cervical polyp 09/10/2019    Cystocele, midline grade 1 05/31/2018      Orders Placed This Encounter   Procedures    Culture, Strep B Screen, Vaginal/Rectal     Standing Status:   Future     Standing Expiration Date:   12/5/2023    CBC     Standing Status:   Future     Standing Expiration Date:   12/7/2023         Education  Pt was educated on s/s labor and FM monitoring. Discussed birth plan  Pt was instructed to call your provider if:    You have any signs or symptoms that are not normal.  You are thinking of taking any new medicines, vitamins, or herbs. You have any bleeding. You have increased vaginal discharge with odor. You have a fever, chills, or pain when passing urine. You have headaches. You have changes or blind spots in your eyesight. Your water breaks. You start having regular, painful contractions q5 min, lasting 1 hr  You notice a decrease in fetal movement. You have significant swelling and weight gain. You have chest pain or difficulty breathing.         No pregnancy checklist tasks were completed during this visit, and no tasks are pending completion. She was counseled regarding all of the above:    Return in 1 week (on 12/13/2022) for Return OB. The patient, Martir Hanley was seen with a total time spent of 30 minutes for the visit on this date of service by the [facility]  Both face-to-face (counseling and education) and non face-to-face time (care coordination), were spent in determining the total time component.      Electronically Signed By: RAH Dewitt CNM

## 2022-12-06 NOTE — PROGRESS NOTES
Pt is here today at her 36w6d appt  Pt states fetal movement is present  Pt has no complaints today.

## 2022-12-07 LAB
HCT VFR BLD CALC: 38.9 % (ref 36.3–47.1)
HEMOGLOBIN: 12.1 G/DL (ref 11.9–15.1)
MCH RBC QN AUTO: 31.9 PG (ref 25.2–33.5)
MCHC RBC AUTO-ENTMCNC: 31.1 G/DL (ref 28.4–34.8)
MCV RBC AUTO: 102.6 FL (ref 82.6–102.9)
NRBC AUTOMATED: 0 PER 100 WBC
PDW BLD-RTO: 14.5 % (ref 11.8–14.4)
PLATELET # BLD: 159 K/UL (ref 138–453)
PMV BLD AUTO: 9.7 FL (ref 8.1–13.5)
RBC # BLD: 3.79 M/UL (ref 3.95–5.11)
WBC # BLD: 7.2 K/UL (ref 3.5–11.3)

## 2022-12-10 LAB
CULTURE: NORMAL
SPECIMEN DESCRIPTION: NORMAL

## 2022-12-19 ENCOUNTER — ROUTINE PRENATAL (OUTPATIENT)
Dept: OBGYN CLINIC | Age: 42
End: 2022-12-19
Payer: MEDICARE

## 2022-12-19 VITALS
WEIGHT: 205 LBS | DIASTOLIC BLOOD PRESSURE: 68 MMHG | HEART RATE: 86 BPM | SYSTOLIC BLOOD PRESSURE: 110 MMHG | BODY MASS INDEX: 34.11 KG/M2

## 2022-12-19 DIAGNOSIS — Z3A.38 38 WEEKS GESTATION OF PREGNANCY: ICD-10-CM

## 2022-12-19 DIAGNOSIS — R73.09 ABNORMAL GLUCOSE: ICD-10-CM

## 2022-12-19 DIAGNOSIS — O09.43 HIGH RISK MULTIGRAVIDA IN THIRD TRIMESTER: Primary | ICD-10-CM

## 2022-12-19 DIAGNOSIS — Z64.1 GRAND MULTIPARA: ICD-10-CM

## 2022-12-19 DIAGNOSIS — O24.410 DIET CONTROLLED GESTATIONAL DIABETES MELLITUS (GDM) IN THIRD TRIMESTER: ICD-10-CM

## 2022-12-19 PROCEDURE — 59025 FETAL NON-STRESS TEST: CPT | Performed by: ADVANCED PRACTICE MIDWIFE

## 2022-12-19 PROCEDURE — 99213 OFFICE O/P EST LOW 20 MIN: CPT | Performed by: ADVANCED PRACTICE MIDWIFE

## 2022-12-19 PROCEDURE — G8419 CALC BMI OUT NRM PARAM NOF/U: HCPCS | Performed by: ADVANCED PRACTICE MIDWIFE

## 2022-12-19 PROCEDURE — G8427 DOCREV CUR MEDS BY ELIG CLIN: HCPCS | Performed by: ADVANCED PRACTICE MIDWIFE

## 2022-12-19 PROCEDURE — G8484 FLU IMMUNIZE NO ADMIN: HCPCS | Performed by: ADVANCED PRACTICE MIDWIFE

## 2022-12-19 PROCEDURE — 1036F TOBACCO NON-USER: CPT | Performed by: ADVANCED PRACTICE MIDWIFE

## 2022-12-19 NOTE — PROGRESS NOTES
Pt is here today at her 38w5 prenatal visit with NST  Pt states fetal movement is present  Pt has no questions

## 2022-12-19 NOTE — PROGRESS NOTES
SUBJECTIVE:    Caroline Hinkle is here for her routine OB visit. She reports  fetal movement. She denies  vaginal bleeding. She denies  leaking of fluid. She denies  vaginal discharge. She denies  uterine contraction activity. She denies  nausea and/or vomiting. She denies retaining fluid in her extremities. BS scanned, all wnl. Wants to avoid iOL. OBJECTIVE:   Blood pressure 110/68, pulse 86, weight 205 lb (93 kg), not currently breastfeeding. 0 1 2 3 Patient    Dilation Closed 1-2 3-4 5-6 1    Effacement 0-30 40-50 60-70 >80 1    Station -3 -2 -1/0 +1/+2 0    Consistency Firm Med Soft  2    Position Post Mid Ant  1   TOTAL        5             ASSESSMENT/PLAN:  1. High risk multigravida in third trimester    - [place] FETAL NON-STRESS TEST    2. Diet controlled gestational diabetes mellitus (GDM) in third trimester    - [place] FETAL NON-STRESS TEST    3. Abnormal glucose      4. [address] multipara      5. 38 weeks gestation of pregnancy        The problem list was reviewed and updated as needed. Caroline Hinkle will monitor for fetal movements daily    GBS protocol and testing was not discussed. GBS culture was obtained. Results were reviewed. Signs of labor to report were discussed. Birth preferences were discussed. Post-dates testing and protocol were discussed  Caroline Hinkle was not counseled regarding Post Partum Depression. She has decided on contraceptive choice. Caroline Hinkle was counseled regarding all of the above    The patient, Earnest Subramanian,  was seen with a total time spent of 20 minutes for the visit on this date of service by the [facility]  The time component, involved both face-to-face (counseling and education)  and non face-to-face time (care coordination), spent in determining the total time component.

## 2022-12-28 ENCOUNTER — TELEPHONE (OUTPATIENT)
Dept: OBGYN CLINIC | Age: 42
End: 2022-12-28

## 2022-12-29 ENCOUNTER — ROUTINE PRENATAL (OUTPATIENT)
Dept: OBGYN CLINIC | Age: 42
End: 2022-12-29
Payer: MEDICARE

## 2022-12-29 VITALS — WEIGHT: 206 LBS | BODY MASS INDEX: 34.28 KG/M2 | DIASTOLIC BLOOD PRESSURE: 64 MMHG | SYSTOLIC BLOOD PRESSURE: 106 MMHG

## 2022-12-29 DIAGNOSIS — R73.09 ABNORMAL GLUCOSE: ICD-10-CM

## 2022-12-29 DIAGNOSIS — O24.410 DIET CONTROLLED GESTATIONAL DIABETES MELLITUS (GDM) IN THIRD TRIMESTER: ICD-10-CM

## 2022-12-29 DIAGNOSIS — O09.43 HIGH RISK MULTIGRAVIDA IN THIRD TRIMESTER: Chronic | ICD-10-CM

## 2022-12-29 DIAGNOSIS — O09.523 MULTIGRAVIDA OF ADVANCED MATERNAL AGE IN THIRD TRIMESTER: Chronic | ICD-10-CM

## 2022-12-29 DIAGNOSIS — Z3A.40 40 WEEKS GESTATION OF PREGNANCY: Primary | ICD-10-CM

## 2022-12-29 DIAGNOSIS — Z64.1 GRAND MULTIPARA: ICD-10-CM

## 2022-12-29 PROCEDURE — G8427 DOCREV CUR MEDS BY ELIG CLIN: HCPCS | Performed by: ADVANCED PRACTICE MIDWIFE

## 2022-12-29 PROCEDURE — 1036F TOBACCO NON-USER: CPT | Performed by: ADVANCED PRACTICE MIDWIFE

## 2022-12-29 PROCEDURE — 99214 OFFICE O/P EST MOD 30 MIN: CPT | Performed by: ADVANCED PRACTICE MIDWIFE

## 2022-12-29 PROCEDURE — G8484 FLU IMMUNIZE NO ADMIN: HCPCS | Performed by: ADVANCED PRACTICE MIDWIFE

## 2022-12-29 PROCEDURE — G8419 CALC BMI OUT NRM PARAM NOF/U: HCPCS | Performed by: ADVANCED PRACTICE MIDWIFE

## 2022-12-29 SDOH — ECONOMIC STABILITY: FOOD INSECURITY: WITHIN THE PAST 12 MONTHS, YOU WORRIED THAT YOUR FOOD WOULD RUN OUT BEFORE YOU GOT MONEY TO BUY MORE.: NEVER TRUE

## 2022-12-29 SDOH — ECONOMIC STABILITY: FOOD INSECURITY: WITHIN THE PAST 12 MONTHS, THE FOOD YOU BOUGHT JUST DIDN'T LAST AND YOU DIDN'T HAVE MONEY TO GET MORE.: NEVER TRUE

## 2022-12-29 ASSESSMENT — PATIENT HEALTH QUESTIONNAIRE - PHQ9
SUM OF ALL RESPONSES TO PHQ9 QUESTIONS 1 & 2: 0
SUM OF ALL RESPONSES TO PHQ QUESTIONS 1-9: 0
SUM OF ALL RESPONSES TO PHQ QUESTIONS 1-9: 0
2. FEELING DOWN, DEPRESSED OR HOPELESS: 0
SUM OF ALL RESPONSES TO PHQ QUESTIONS 1-9: 0
SUM OF ALL RESPONSES TO PHQ QUESTIONS 1-9: 0
1. LITTLE INTEREST OR PLEASURE IN DOING THINGS: 0

## 2022-12-29 ASSESSMENT — SOCIAL DETERMINANTS OF HEALTH (SDOH): HOW HARD IS IT FOR YOU TO PAY FOR THE VERY BASICS LIKE FOOD, HOUSING, MEDICAL CARE, AND HEATING?: NOT HARD AT ALL

## 2022-12-29 NOTE — PROGRESS NOTES
Patient here for 40w1d prenatal visit  Patient states fetal movement Present but feels like there is less movement  Patient concerns: none

## 2022-12-29 NOTE — PROGRESS NOTES
[facility]  [address] [place] [address].  [place] [address] [place] [id number]  Dept: [phone]      Patient Name: Raul Thomas  Patient : 1980  MRN #: [id number]  [facility] #: [id number]    Date: 2022  Time: 12:32 PM  Chief Complaint   Patient presents with    Routine Prenatal Visit     No LMP recorded. Patient is pregnant. SUBJECTIVE:    Wyline Osler is here for her return OB visit. She is 40w1d weeks pregnant. She reports  feeling fetal movement. She denies vaginal bleeding, vaginal discharge, leaking of fluid. She reports uterine cramping. She has noticed frequent contractions but no consistent enough for labor. She denies  nausea and/or vomiting. She denies HA, visual changes, edema, or RUQ pain.      Blood sugars- WNL      OB History    Para Term  AB Living   8 7 7     7   SAB IAB Ectopic Molar Multiple Live Births             7      # Outcome Date GA Lbr Ben/2nd Weight Sex Delivery Anes PTL Lv   8 Current            7 Term 16 40w2d   F Vag-Spont   ALIN   6 Term 13 38w0d   M Vag-Spont None N ALIN   5 Term 11   9 lb (4.082 kg) F Vag-Spont None N ALIN   4 Term 09   8 lb (3.629 kg) M Vag-Spont EPI N ALIN   3 Term 06 38w0d  8 lb (3.629 kg) M Vag-Spont EPI  ALIN      Complications: Hypospadias   2 Term 04 38w0d  8 lb (3.629 kg) M Vag-Spont EPI N ALIN   1 Term 01 38w0d  8 lb (3.629 kg) M Vag-Spont  N ALIN      Birth Comments: Length of Labor- 12     Past Medical History:   Diagnosis Date    Gestational hypertension     Pregnancy 1 & 2     Past Surgical History:   Procedure Laterality Date    TONSILLECTOMY AND ADENOIDECTOMY      WISDOM TOOTH EXTRACTION       Current Outpatient Medications   Medication Sig Dispense Refill    Docusate Sodium 100 MG TABS Take by mouth      blood glucose monitor kit and supplies Dispense sufficient amount for indicated testing frequency plus additional to accommodate PRN testing needs. Dispense all needed supplies to include: monitor, strips, lancing device, lancets, control solutions, alcohol swabs. 1 kit 0    Prenatal MV-Min-Fe Fum-FA-DHA (PRENATAL 1 PO) Take by mouth      vitamin D (ERGOCALCIFEROL) 1.25 MG (85937 UT) CAPS capsule Take 1 capsule by mouth once a week 30 capsule 0     No current facility-administered medications for this visit. No Known Allergies    OBJECTIVE:  /64   Wt 206 lb (93.4 kg)   BMI 34.28 kg/m²   Wt Readings from Last 3 Encounters:   12/29/22 206 lb (93.4 kg)   12/19/22 205 lb (93 kg)   12/06/22 205 lb (93 kg)     Body mass index is 34.28 kg/m². Total weight gain: 16 lb (7.258 kg)    Neto Gallagher has not received the flu vaccine as appropriate. Neto Gallagher has not received the Tdap vaccine as appropriate  Neto Gallagher has not received the COVID vaccine as appropriate      INDICATIONS:AMA, GDM    NST:    MONITORING TECHNIQUE: cEFM  FHR BASELINE:125  FHR VARIABILITY:moderate  ACCELERATIONS;present  DECELERATIONS:absent  INTERPRETATION: REASSURING AND REACTIVE  2 OR MORE ACCELS OF 15 BPM LASTING 15 SEC IN 20 MIN PERIOD  IMPRESSION: Fetal status is reassuring by non-stress test. (Category 1)  FETAL MOVEMENT : PT PERCEIVED FETAL ACTIVITY DURING NST. RECOMMENDATIONS: Continue fetal surveillance as previously outlined. Kick counts reviewed. Encourage patient to continue fetal kick counts. NST monitoring strip reviewed by ordering physician, signed, and filed per office policy. Discharged home:    [facility]-8/8    ASSESSMENT/PLAN:  IUP 40w1d weeks  [id number]  Pregnancy  Due date is based on early dating ultrasound  Patient's prenatal labs are completed. Patient's blood type is B+ and rhogam is not indicated in pregnancy. Gc/ct-   urine culture-  UDS - neg  Hep B- NR  Hep C- NR  HIV- NR  H/H/P:12.0/33.8  Rubella-immune  T.  Pallidum, IgG- NR   Varicella-      Anatomy scan: Placenta posterior, normal cord insertion, no low lying/previa noted. Anatomy WNL, Follow up PRN. Glucola 164 between 24-28 weeks. 3hr GTT 83/207/206/141  H/H/P- 12.0/33.8  T.  Pallidium - NR  GBS- negative        1/2/23 @0800 IOL    Transfer of care @ 33 wk:  B positive/negative  UDS: Negative  Hepatitis C: Negative  Hepatitis B: Negative  HIV: Negative  Syphillis: NR  Rubella: Immune  CF: Negative  Electrophoresis: Negative  H/H: 12.0/33.8  Glucola: 164  3 hr GTT: (ABN) 83/207/206/141     Patient Active Problem List    Diagnosis Date Noted    High risk multigravida in third trimester 12/01/2022     Priority: High     Overview Note:     Transfer of care @ 33 wk ([place]):    Chart sent for review to Dr Josr Diaz: (12/1)  Reviewed by Giuseppe Sung DO 12/1/22       Advanced maternal age in multigravida 11/04/2015     Priority: High     Overview Note:     NIPT: Negative    NST weekly @ 37 wks  Induction at 44 week:   12/29/22- declined IOL set for 1/2/23 @0800      History of gestational hypertension (G1) 12/05/2022     Priority: Medium     Overview Note:     Baseline labs: declined 12/6/22      Psoriasis 12/01/2022     Priority: Medium    Grand multipara 12/01/2022     Priority: Medium    Diet controlled gestational diabetes mellitus (GDM) in third trimester 11/15/2022     Priority: Medium     Overview Note:     1hr 164  3hr GTT- 83/207/206/141  Diet controlled-     Induction 39- 40 6/7 wk:  Check blood sugars q2hr in labor      Abnormal glucose> GDM 11/15/2022     Overview Note:     1 hr 169  3 hour: elevated      Fingernail abnormalities 04/04/2021    Chronic fatigue 04/04/2021    Vitamin D insufficiency 04/04/2021    Cervical polyp 09/10/2019    Cystocele, midline grade 1 05/31/2018      Orders Placed This Encounter   Procedures    US FETAL BIOPHYS PROFILE W NON STRESS     Standing Status:   Future     Number of Occurrences:   1     Standing Expiration Date:   12/29/2023     Order Specific Question:   Reason for exam:     Answer:   postdates       Pt declined IOL today. Discussed recommendation for AMA and GDM is 39 week IOL due to increased risk of stillbirth. Pt is agreeable to NST/ BPP today w/ IOL 1/2/23      Education  Pt was educated on s/s labor and FM monitoring. Pt was instructed to call your provider if:    You have any signs or symptoms that are not normal.  You are thinking of taking any new medicines, vitamins, or herbs. You have any bleeding. You have increased vaginal discharge with odor. You have a fever, chills, or pain when passing urine. You have headaches. You have changes or blind spots in your eyesight. Your water breaks. You start having regular, painful contractions q5 min, lasting 1 hr  You notice a decrease in fetal movement. You have significant swelling and weight gain. You have chest pain or difficulty breathing. Discussed breast pump prescription. Post-term and post date testing discussed    No pregnancy checklist tasks were completed during this visit, and no tasks are pending completion. She was counseled regarding all of the above:    Return for IOL 1/2/23. The patient, Alfonso Joy was seen with a total time spent of 30 minutes for the visit on this date of service by the [facility]  Both face-to-face (counseling and education) and non face-to-face time (care coordination), were spent in determining the total time component.      Electronically Signed By: RAH Williamson CNM

## 2023-01-01 ENCOUNTER — TELEPHONE (OUTPATIENT)
Dept: OBGYN CLINIC | Age: 43
End: 2023-01-01

## 2023-01-02 NOTE — TELEPHONE ENCOUNTER
Spoke with patient, she had called the answering service. She would like to cancel her IOL of labor for tomorrow. Patient states she is feeling good fetal movement but thinks that all the stress of the IOL is what is stopping her from going into labor on her own. Patient advised that she should continue to monitor fetal movement and page CNM if she has any concerns. Patient also advised that she should call the office on Tuesday to schedule an appointment to been seen, will need BPP/NST. Patient voiced understanding and states she will discuss everything on Tuesday with Alexandru Zuniga.

## 2023-01-03 ENCOUNTER — PROCEDURE VISIT (OUTPATIENT)
Dept: OBGYN CLINIC | Age: 43
End: 2023-01-03

## 2023-01-03 ENCOUNTER — ROUTINE PRENATAL (OUTPATIENT)
Dept: OBGYN CLINIC | Age: 43
End: 2023-01-03
Payer: MEDICARE

## 2023-01-03 VITALS — HEART RATE: 77 BPM | DIASTOLIC BLOOD PRESSURE: 80 MMHG | SYSTOLIC BLOOD PRESSURE: 126 MMHG

## 2023-01-03 DIAGNOSIS — O09.523 MULTIGRAVIDA OF ADVANCED MATERNAL AGE IN THIRD TRIMESTER: ICD-10-CM

## 2023-01-03 DIAGNOSIS — R73.09 ABNORMAL GLUCOSE: ICD-10-CM

## 2023-01-03 DIAGNOSIS — O48.0 POST-TERM PREGNANCY, 40-42 WEEKS OF GESTATION: ICD-10-CM

## 2023-01-03 DIAGNOSIS — O09.43 HIGH RISK MULTIGRAVIDA IN THIRD TRIMESTER: Primary | ICD-10-CM

## 2023-01-03 DIAGNOSIS — O24.410 DIET CONTROLLED GESTATIONAL DIABETES MELLITUS (GDM) IN THIRD TRIMESTER: ICD-10-CM

## 2023-01-03 DIAGNOSIS — Z64.1 GRAND MULTIPARA: ICD-10-CM

## 2023-01-03 DIAGNOSIS — O24.419 GESTATIONAL DIABETES MELLITUS (GDM) IN THIRD TRIMESTER, GESTATIONAL DIABETES METHOD OF CONTROL UNSPECIFIED: ICD-10-CM

## 2023-01-03 PROCEDURE — G8484 FLU IMMUNIZE NO ADMIN: HCPCS | Performed by: ADVANCED PRACTICE MIDWIFE

## 2023-01-03 PROCEDURE — 1036F TOBACCO NON-USER: CPT | Performed by: ADVANCED PRACTICE MIDWIFE

## 2023-01-03 PROCEDURE — G8427 DOCREV CUR MEDS BY ELIG CLIN: HCPCS | Performed by: ADVANCED PRACTICE MIDWIFE

## 2023-01-03 PROCEDURE — 99214 OFFICE O/P EST MOD 30 MIN: CPT | Performed by: ADVANCED PRACTICE MIDWIFE

## 2023-01-03 PROCEDURE — 59025 FETAL NON-STRESS TEST: CPT | Performed by: ADVANCED PRACTICE MIDWIFE

## 2023-01-03 PROCEDURE — G8419 CALC BMI OUT NRM PARAM NOF/U: HCPCS | Performed by: ADVANCED PRACTICE MIDWIFE

## 2023-01-03 NOTE — PROGRESS NOTES
[facility] B AND GYNECOLOGY  [address] [place] [address] [address] [place] [address]  Dept: [phone]      Patient Name: Jordon Colindres  Patient : 1980  MRN #: [id number]  [facility] #: [id number]    Date: 1/3/2023  Time: 4:09 PM  Chief Complaint   Patient presents with    Routine Prenatal Visit     40w6     No LMP recorded. Patient is pregnant. SUBJECTIVE:    Megan Bowers is here for her return OB visit. She is 40w6d weeks pregnant. She reports  feeling fetal movement. She denies vaginal bleeding, vaginal discharge, leaking of fluid. She reports uterine cramping. She reports to have uterine contractions all weekend but not persistent. She denies  nausea and/or vomiting. She denies HA, visual changes, edema, or RUQ pain. Blood sugars WNL    Pt desires to cancel IOL scheduled for 23 and reschedule for 23.     OB History    Para Term  AB Living   8 7 7     7   SAB IAB Ectopic Molar Multiple Live Births             7      # Outcome Date GA Lbr Ben/2nd Weight Sex Delivery Anes PTL Lv   8 Current            7 Term 16 40w2d   F Vag-Spont   ALIN   6 Term 13 38w0d   M Vag-Spont None N ALIN   5 Term 11   9 lb (4.082 kg) F Vag-Spont None N ALIN   4 Term 09   8 lb (3.629 kg) M Vag-Spont EPI N ALIN   3 Term 06 38w0d  8 lb (3.629 kg) M Vag-Spont EPI  ALIN      Complications: Hypospadias   2 Term 04 38w0d  8 lb (3.629 kg) M Vag-Spont EPI N ALIN   1 Term 01 38w0d  8 lb (3.629 kg) M Vag-Spont  N ALIN      Birth Comments: Length of Labor- 12     Past Medical History:   Diagnosis Date    Gestational hypertension     Pregnancy 1 & 2     Past Surgical History:   Procedure Laterality Date    TONSILLECTOMY AND ADENOIDECTOMY      WISDOM TOOTH EXTRACTION       Current Outpatient Medications   Medication Sig Dispense Refill    Docusate Sodium 100 MG TABS Take by mouth      blood glucose monitor kit and supplies Dispense sufficient amount for indicated testing frequency plus additional to accommodate PRN testing needs. Dispense all needed supplies to include: monitor, strips, lancing device, lancets, control solutions, alcohol swabs. 1 kit 0    Prenatal MV-Min-Fe Fum-FA-DHA (PRENATAL 1 PO) Take by mouth (Patient not taking: Reported on 1/3/2023)      vitamin D (ERGOCALCIFEROL) 1.25 MG (28383 UT) CAPS capsule Take 1 capsule by mouth once a week 30 capsule 0     No current facility-administered medications for this visit. No Known Allergies    OBJECTIVE:  /80   Pulse 77   Wt Readings from Last 3 Encounters:   12/29/22 206 lb (93.4 kg)   12/19/22 205 lb (93 kg)   12/06/22 205 lb (93 kg)     There is no height or weight on file to calculate BMI. Total weight gain: 16 lb (7.258 kg)    Melida Sullivan has not received the flu vaccine as appropriate. Melida Sullivan has not received the Tdap vaccine as appropriate  Melida Sullivan has not received the COVID vaccine as appropriate    INDICATIONS:postdates/ AMA/GDM    NST:    MONITORING TECHNIQUE: cEFM  FHR BASELINE:120   FHR VARIABILITY:moderate  ACCELERATIONS;present  DECELERATIONS:absent  INTERPRETATION: REASSURING AND REACTIVE  2 OR MORE ACCELS OF 15 BPM LASTING 15 SEC IN 20 MIN PERIOD  IMPRESSION: Fetal status is reassuring by non-stress test. (Category 1)  FETAL MOVEMENT : PT PERCEIVED FETAL ACTIVITY DURING NST. RECOMMENDATIONS: Continue fetal surveillance as previously outlined. Kick counts reviewed. Encourage patient to continue fetal kick counts. NST monitoring strip reviewed by ordering physician, signed, and filed per office policy. Discharged home:    [facility] 8/8    ASSESSMENT/PLAN:  IUP 40w6d weeks  [id number]      Pregnancy  Due date is based on early dating ultrasound  Patient's prenatal labs are completed. Patient's blood type is B+ and rhogam is not indicated in pregnancy.   Gc/ct-   urine culture-  UDS - neg  Hep B- NR  Hep C- NR  HIV- NR  H/H/P:12.0/33.8  Rubella-immune  T. Pallidum, IgG- NR   Varicella-      Anatomy scan: Placenta posterior, normal cord insertion, no low lying/previa noted. Anatomy WNL, Follow up PRN. Glucola 164 between 24-28 weeks. 3hr GTT 83/207/206/141  H/H/P- 12.0/33.8  T. Pallidium - NR  GBS- negative      1/2/23 @0800 IOL    Transfer of care @ 33 wk:  B positive/negative  UDS: Negative  Hepatitis C: Negative  Hepatitis B: Negative  HIV: Negative  Syphillis: NR  Rubella: Immune  CF: Negative  Electrophoresis: Negative  H/H: 12.0/33.8  Glucola: 164  3 hr GTT: (ABN) 83/207/206/141     Patient Active Problem List    Diagnosis Date Noted    High risk multigravida in third trimester 12/01/2022     Priority: High     Overview Note:     Transfer of care @ 33 wk ([place]):    Chart sent for review to Dr Ashely Monsalve: (12/1)  Reviewed by Mil Walter DO 12/1/22       Advanced maternal age in multigravida 11/04/2015     Priority: High     Overview Note:     NIPT: Negative    NST weekly @ 37 wks  Induction at 44 week:   12/29/22- declined IOL set for 1/2/23 @0800      History of gestational hypertension (G1) 12/05/2022     Priority: Medium     Overview Note:     Baseline labs: declined 12/6/22      Psoriasis 12/01/2022     Priority: Medium    Grand multipara 12/01/2022     Priority: Medium    Diet controlled gestational diabetes mellitus (GDM) in third trimester 11/15/2022     Priority: Medium     Overview Note:     1hr 164  3hr GTT- 83/207/206/141  Diet controlled-     Induction 39- 40 6/7 wk:  Check blood sugars q2hr in labor      Abnormal glucose> GDM 11/15/2022     Overview Note:     1 hr 169  3 hour: elevated      Fingernail abnormalities 04/04/2021    Chronic fatigue 04/04/2021    Vitamin D insufficiency 04/04/2021    Cervical polyp 09/10/2019    Cystocele, midline grade 1 05/31/2018      Discussed risk/ benefits of IOL methods with patient.    Discussed increased risk of stillbirth w/ pt going over 40 weeks due to high risk diagnoses. NST/BPP today reactive. Pt agreeable to IOL 1/5/23     Education    Pt was educated on s/s labor and FM monitoring. Pt was instructed to call your provider if:    You have any signs or symptoms that are not normal.  You are thinking of taking any new medicines, vitamins, or herbs. You have any bleeding. You have increased vaginal discharge with odor. You have a fever, chills, or pain when passing urine. You have headaches. You have changes or blind spots in your eyesight. Your water breaks. You start having regular, painful contractions q5 min, lasting 1 hr  You notice a decrease in fetal movement. You have significant swelling and weight gain. You have chest pain or difficulty breathing. She was counseled regarding all of the above:    Return in about 2 days (around 1/5/2023) for IOL 1/5/23. The patient, Trey Krause was seen with a total time spent of 30 minutes for the visit on this date of service by the [facility]  Both face-to-face (counseling and education) and non face-to-face time (care coordination), were spent in determining the total time component.      Electronically Signed By: RAH Abraham CNM

## 2023-01-09 ENCOUNTER — HOSPITAL ENCOUNTER (INPATIENT)
Age: 43
LOS: 1 days | Discharge: HOME | DRG: 560 | End: 2023-01-10
Attending: OBSTETRICS & GYNECOLOGY | Admitting: OBSTETRICS & GYNECOLOGY
Payer: MEDICARE

## 2023-01-09 PROBLEM — Z37.9 NORMAL LABOR: Status: ACTIVE | Noted: 2023-01-09

## 2023-01-09 LAB
ABO/RH: NORMAL
ANTIBODY SCREEN: NEGATIVE
ARM BAND NUMBER: NORMAL
EXPIRATION DATE: NORMAL
HCT VFR BLD CALC: 37.5 % (ref 36.3–47.1)
HEMOGLOBIN: 13.1 G/DL (ref 11.9–15.1)
MCH RBC QN AUTO: 32 PG (ref 25.2–33.5)
MCHC RBC AUTO-ENTMCNC: 34.9 G/DL (ref 28.4–34.8)
MCV RBC AUTO: 91.7 FL (ref 82.6–102.9)
NRBC AUTOMATED: 0 PER 100 WBC
PDW BLD-RTO: 13.2 % (ref 11.8–14.4)
PLATELET # BLD: 176 K/UL (ref 138–453)
PMV BLD AUTO: 9.5 FL (ref 8.1–13.5)
RBC # BLD: 4.09 M/UL (ref 3.95–5.11)
T. PALLIDUM, IGG: NONREACTIVE
WBC # BLD: 11.1 K/UL (ref 3.5–11.3)

## 2023-01-09 PROCEDURE — 7200000001 HC VAGINAL DELIVERY

## 2023-01-09 PROCEDURE — 6370000000 HC RX 637 (ALT 250 FOR IP): Performed by: ADVANCED PRACTICE MIDWIFE

## 2023-01-09 PROCEDURE — 86900 BLOOD TYPING SEROLOGIC ABO: CPT

## 2023-01-09 PROCEDURE — 86780 TREPONEMA PALLIDUM: CPT

## 2023-01-09 PROCEDURE — 1220000000 HC SEMI PRIVATE OB R&B

## 2023-01-09 PROCEDURE — 85027 COMPLETE CBC AUTOMATED: CPT

## 2023-01-09 PROCEDURE — 86901 BLOOD TYPING SEROLOGIC RH(D): CPT

## 2023-01-09 PROCEDURE — 99024 POSTOP FOLLOW-UP VISIT: CPT | Performed by: ADVANCED PRACTICE MIDWIFE

## 2023-01-09 PROCEDURE — 59409 OBSTETRICAL CARE: CPT | Performed by: ADVANCED PRACTICE MIDWIFE

## 2023-01-09 PROCEDURE — 86850 RBC ANTIBODY SCREEN: CPT

## 2023-01-09 RX ORDER — METHYLERGONOVINE MALEATE 0.2 MG/ML
200 INJECTION INTRAVENOUS PRN
Status: DISCONTINUED | OUTPATIENT
Start: 2023-01-09 | End: 2023-01-09 | Stop reason: HOSPADM

## 2023-01-09 RX ORDER — SODIUM CHLORIDE 9 MG/ML
25 INJECTION, SOLUTION INTRAVENOUS PRN
Status: DISCONTINUED | OUTPATIENT
Start: 2023-01-09 | End: 2023-01-09

## 2023-01-09 RX ORDER — SODIUM CHLORIDE, SODIUM LACTATE, POTASSIUM CHLORIDE, AND CALCIUM CHLORIDE .6; .31; .03; .02 G/100ML; G/100ML; G/100ML; G/100ML
1000 INJECTION, SOLUTION INTRAVENOUS PRN
Status: DISCONTINUED | OUTPATIENT
Start: 2023-01-09 | End: 2023-01-09

## 2023-01-09 RX ORDER — ONDANSETRON 2 MG/ML
4 INJECTION INTRAMUSCULAR; INTRAVENOUS EVERY 6 HOURS PRN
Status: DISCONTINUED | OUTPATIENT
Start: 2023-01-09 | End: 2023-01-09

## 2023-01-09 RX ORDER — TRANEXAMIC ACID 10 MG/ML
1000 INJECTION, SOLUTION INTRAVENOUS
Status: DISCONTINUED | OUTPATIENT
Start: 2023-01-09 | End: 2023-01-09

## 2023-01-09 RX ORDER — DOCUSATE SODIUM 100 MG/1
100 CAPSULE, LIQUID FILLED ORAL 2 TIMES DAILY
Status: DISCONTINUED | OUTPATIENT
Start: 2023-01-09 | End: 2023-01-10 | Stop reason: HOSPADM

## 2023-01-09 RX ORDER — CARBOPROST TROMETHAMINE 250 UG/ML
250 INJECTION, SOLUTION INTRAMUSCULAR PRN
Status: DISCONTINUED | OUTPATIENT
Start: 2023-01-09 | End: 2023-01-09

## 2023-01-09 RX ORDER — ACETAMINOPHEN 500 MG
1000 TABLET ORAL EVERY 6 HOURS PRN
Status: DISCONTINUED | OUTPATIENT
Start: 2023-01-09 | End: 2023-01-10 | Stop reason: HOSPADM

## 2023-01-09 RX ORDER — LIDOCAINE HYDROCHLORIDE 10 MG/ML
INJECTION, SOLUTION INFILTRATION; PERINEURAL
Status: DISCONTINUED
Start: 2023-01-09 | End: 2023-01-09 | Stop reason: WASHOUT

## 2023-01-09 RX ORDER — SODIUM CHLORIDE 0.9 % (FLUSH) 0.9 %
5-40 SYRINGE (ML) INJECTION EVERY 12 HOURS SCHEDULED
Status: DISCONTINUED | OUTPATIENT
Start: 2023-01-09 | End: 2023-01-09

## 2023-01-09 RX ORDER — SODIUM CHLORIDE, SODIUM LACTATE, POTASSIUM CHLORIDE, AND CALCIUM CHLORIDE .6; .31; .03; .02 G/100ML; G/100ML; G/100ML; G/100ML
500 INJECTION, SOLUTION INTRAVENOUS PRN
Status: DISCONTINUED | OUTPATIENT
Start: 2023-01-09 | End: 2023-01-09

## 2023-01-09 RX ORDER — LANOLIN 72 %
OINTMENT (GRAM) TOPICAL PRN
Status: DISCONTINUED | OUTPATIENT
Start: 2023-01-09 | End: 2023-01-10 | Stop reason: HOSPADM

## 2023-01-09 RX ORDER — IBUPROFEN 600 MG/1
600 TABLET ORAL EVERY 8 HOURS SCHEDULED
Status: DISCONTINUED | OUTPATIENT
Start: 2023-01-09 | End: 2023-01-10 | Stop reason: HOSPADM

## 2023-01-09 RX ORDER — MISOPROSTOL 100 UG/1
800 TABLET ORAL PRN
Status: DISCONTINUED | OUTPATIENT
Start: 2023-01-09 | End: 2023-01-09

## 2023-01-09 RX ORDER — SODIUM CHLORIDE 0.9 % (FLUSH) 0.9 %
5-40 SYRINGE (ML) INJECTION PRN
Status: DISCONTINUED | OUTPATIENT
Start: 2023-01-09 | End: 2023-01-09

## 2023-01-09 RX ADMIN — BENZOCAINE AND LEVOMENTHOL: 200; 5 SPRAY TOPICAL at 16:53

## 2023-01-09 RX ADMIN — DOCUSATE SODIUM 100 MG: 100 CAPSULE ORAL at 09:11

## 2023-01-09 RX ADMIN — ACETAMINOPHEN 1000 MG: 500 TABLET ORAL at 06:18

## 2023-01-09 RX ADMIN — ACETAMINOPHEN 1000 MG: 500 TABLET ORAL at 14:26

## 2023-01-09 RX ADMIN — IBUPROFEN 600 MG: 600 TABLET, FILM COATED ORAL at 21:43

## 2023-01-09 RX ADMIN — IBUPROFEN 600 MG: 600 TABLET, FILM COATED ORAL at 13:06

## 2023-01-09 RX ADMIN — IBUPROFEN 600 MG: 600 TABLET, FILM COATED ORAL at 04:55

## 2023-01-09 RX ADMIN — WITCH HAZEL: 500 SOLUTION RECTAL; TOPICAL at 16:53

## 2023-01-09 RX ADMIN — ACETAMINOPHEN 1000 MG: 500 TABLET ORAL at 23:41

## 2023-01-09 RX ADMIN — DOCUSATE SODIUM 100 MG: 100 CAPSULE ORAL at 21:43

## 2023-01-09 ASSESSMENT — PAIN SCALES - GENERAL
PAINLEVEL_OUTOF10: 5
PAINLEVEL_OUTOF10: 8
PAINLEVEL_OUTOF10: 3
PAINLEVEL_OUTOF10: 9
PAINLEVEL_OUTOF10: 3

## 2023-01-09 ASSESSMENT — PAIN DESCRIPTION - LOCATION
LOCATION: ABDOMEN

## 2023-01-09 ASSESSMENT — PAIN - FUNCTIONAL ASSESSMENT
PAIN_FUNCTIONAL_ASSESSMENT: ACTIVITIES ARE NOT PREVENTED
PAIN_FUNCTIONAL_ASSESSMENT: ACTIVITIES ARE NOT PREVENTED

## 2023-01-09 ASSESSMENT — PAIN DESCRIPTION - DESCRIPTORS
DESCRIPTORS: CRAMPING
DESCRIPTORS: CRAMPING;DISCOMFORT
DESCRIPTORS: ACHING;CRAMPING;DISCOMFORT
DESCRIPTORS: CRAMPING

## 2023-01-09 ASSESSMENT — PAIN DESCRIPTION - ORIENTATION
ORIENTATION: LOWER
ORIENTATION: LOWER

## 2023-01-09 NOTE — FLOWSHEET NOTE
Assisted to bathroom. Gait slow but steady. Unable to void at this time. Kaila care done per pt.  Back to bed for remainder of recovery

## 2023-01-09 NOTE — LACTATION NOTE
Mom reports her baby is nursing well and comfortably. Packet of breastfeeding information given. Reviewed colostral feeds. She noted that baby fed for 15 minutes. Encouraged her to call out for assistance as needed.

## 2023-01-09 NOTE — FLOWSHEET NOTE
Presents with c/o ctx. Appears uncomfortable.  present. Gita gutiérrezm here. Pt to 708. Breathing well with ctx. Pt agreeable to ermelinda.

## 2023-01-09 NOTE — CARE COORDINATION
CASE MANAGEMENT POST-PARTUM TRANSITIONAL CARE PLAN    Normal labor [O80, Z37.9]    OB Provider: [facility] met huy/ Ugo at bedside to discuss DCP. She is S/P  on 2023    Writer verified name/address/phone number correct on facesheet. She states she lives with her  and her 6 children. Caroline Hinkle verbalized no problems with transportation to and from doctors appointments or with paying for medications upon discharge home. [employer] insurance correct. Writer notified Caroline Hinkle she has 30 days from date of birth to add  to insurance policy. She verbalized understanding. Caroline Hinkle confirmed a safe place for infant to sleep at home. Infant name on BC: undecided.    Infant PCP Lori Miguel NP.     DME: no  HOME CARE: no    Anticipate DC of couplet 2023    Readmission Risk              Risk of Unplanned Readmission:  4

## 2023-01-09 NOTE — LACTATION NOTE
In to check on feeds, mom says baby is nursing well, waking to feed for longer periods. Has nursed other children with no supply issues, has a pump at home. Will call out for assistance as needed.

## 2023-01-09 NOTE — L&D DELIVERY NOTE
Mother's Information      Labor Events     Labor?: No  Cervical Ripening:   Now               Edie Mcdonald [[id number]]      Labor Events     Labor?: No   Steroids?: None  Cervical Ripening Date/Time:     Antibiotics Received during Labor?: No  Rupture Date/Time: 23 02:49:00   Rupture Type: SROM  Fluid Color: Clear  Fluid Odor: None  Fluid Volume: None  Induction: None  Augmentation: None  Labor Complications: None       Anesthesia    Method: None       Start Pushing      Labor onset date/time:   Now     Dilation complete date/time:   Now     Start pushing date/time:    Decision date/time (emergent ):           Delivery (Shepherd)      Delivery Date/Time:  23 03:27:00   Delivery Type: Vaginal, Spontaneous    Details:            Shepherd Presentation    Presentation: Vertex       Shoulder Dystocia    Shoulder Dystocia Present?: No  Add Second Maneuver  Add Third Maneuver  Add Fourth Maneuver  Add Fifth Maneuver  Add Sixth Maneuver  Add Seventh Maneuver  Add Eighth Maneuver  Add Ninth Maneuver       Assisted Delivery Details    Forceps Attempted?: No  Vacuum Extractor Attempted?: No       Document Additional Attempt         Document Additional Attempt                 Cord    Vessels: 3 Vessels  Complications: None  Delayed Cord Clamping?: Yes  Cord Clamped Date/Time: 2023 03:43:00  Cord Blood Disposition: Lab  Gases Sent?: Yes       Placenta    Date/Time: 2023 03:41:00  Removal: Spontaneous  Appearance: Intact  Disposition: Discarded       Lacerations    Episiotomy: None  Perineal Lacerations: 1st  Other Lacerations: no non-perineal laceration  Number of Repair Packets: 0       Vaginal Counts    Initial Count Personnel: Ricky Ko  Initial Count Verified By: Quinn Murillo CNM    Sponges [place] [employer]   Initial Counts Correct     Final Counts Correct  Correct   Final Count Personnel: NEEL Castillo  Final Count Verified By: Angelique MARCANO RN  If the count is incorrect due to Intentionally Retained Foreign Object (IRFO) add the IRFO LDA in Lines/Drains. Add LDA: Link to [facility]       Blood Loss  Mother: Isamar King #[id number]     Start of Mother's Information      Delivery Blood Loss  23 1527 - 23 0412      None                 End of Mother's Information  Mother: Isamar King #[id number]                Delivery Providers    Delivering clinician: RAH Cox CNM     Provider Role     Obstetrician    Blake Guthrie RN Primary Nurse     Primary  Nurse     NICU Nurse     Neonatologist     Anesthesiologist     Nurse Anesthetist     Nurse Practitioner    RAH Cox CNM Midwife     Nursery Nurse               Assessment    Living Status: Living     Apgar Scoring Key:    0 1 2    Skin Color: Blue or pale Acrocyanotic Completely pink    Heart Rate: Absent <100 bpm >100 bpm    Reflex Irritability: No response Grimace Cry or active withdrawal    Muscle Tone: Limp Some flexion Active motion    Respiratory Effort: Absent Weak cry; hypoventilation Good, crying                      Skin Color:   Heart Rate:   Reflex Irritability:   Muscle Tone:   Respiratory Effort: Total:            1 Minute:    0    2    2    2    2    8        Apgar 1 total from OB History    5 Minute:    1    2    2    2    2    9        Apgar 5 total from OB History    10 Minute:              15 Minute:              20 Minute:                        Apgars Assigned By: SILVIA MARCANO RN              Resuscitation    Method: Stimulation             Herkimer Measurements               Title      Skin to Skin Initiation Date/Time: 23 03:27:00 EST     Skin to Skin With: Mother     Skin to Skin End Date/Time:                    Department of Obstetrics and Gynecology  Spontaneous Vaginal Delivery Note    Patient Name: Leana Laguerre  Patient : 1980  MRN #: [id number]  [facility] #: [id number]    Date: 2023  Time: 4:12 AM    Diagnosis:  Term pregnancy Spontaneous labor    Procedure:  Spontaneous vaginal delivery    Delivered By: Alverto Howard     Baby:      Information for the patient's :  Aav Woodard [[id number]]   APGAR One: 8   Information for the patient's :  Ava Woodard [[id number]]   APGAR Five: 9   Information for the patient's :  Ava Woodard [[id number]]   Birth Weight: N/A     Anesthesia:  none    QBL:  50mL    Specimen:  Placenta not sent to pathology     Cord blood sent Yes    Complications:  none    Condition:  infant stable to general nursery    Details of Procedure: The patient is a 38 y.o. female at 44w9d brenner   OB History          8    Para   7    Term   7            AB        Living   7         SAB        IAB        Ectopic        Molar        Multiple        Live Births   7             who was admitted for active phase labor. She received the following interventions: none The patient progressed well, did not receive an epidural, became complete and started to push. After pushing, the fetal head was at the perineum, gentle downward pressure placed to facilitate the delivery of the anterior shoulder, and the rest of the infant delivered atraumatically. The  was placed on mother abdomen skin to skin with spontaneous cry and good tone. The cord was clamped after more than one minute delay and cut. Pitocin was not infused intravenously after delivery of the . The delivery of the placenta was spontaneous. The perineum and vagina were explored and a first degree laceration was found to be hemostatic and did not require repair. Cervix intact. Uterus firm. Bleeding has good hemostasis upon leaving the room. Mom and  are stable.      Viable male/ female  Amniotic Fluid: clear  Fetal Delivery Position: ZACH  Maternal Birthing position: hands and knees  Nuchal cord: no    RAH Mcallister CNM

## 2023-01-09 NOTE — H&P
OBSTETRICAL HISTORY [address] LABOR & DELIVERY  [place] [address] [place] [address]  Dept: [phone]  Loc: [phone]  Provider:  RAH Renee CNM      Date: 2023  Time: 1:59 AM    Patient Name: Dacia Medina  Patient : 1980  Room/Bed: 4195/9181-06  Admission Date/Time: 2023  1:18 AM  MRN #: [id number]  [facility] #: [id number]    Primary Care Physician: Liza Vera MD  Admitting Provider: Dr Shawn Garrison is a 38 y.o. female [id number]    CC: Onset of Labor       HPI: Dacia Medina is a 38 y.o. [id number] at 41w5d who presents in spontaneous labor. The patient reports fetal movement is present, complains of contractions, denies loss of fluid, denies vaginal bleeding. DATING:  LMP: No LMP recorded. Patient is pregnant. Estimated Date of Delivery: 22   Based on: early ultrasound     PREGNANCY RISK FACTORS:  Patient Active Problem List   Diagnosis    Advanced maternal age in multigravida    Cystocele, midline grade 1    Cervical polyp    Fingernail abnormalities    Chronic fatigue    Vitamin D insufficiency    Abnormal glucose> GDM    Diet controlled gestational diabetes mellitus (GDM) in third trimester    High risk multigravida in third trimester    Psoriasis    Grand multipara    History of gestational hypertension (G1)         Allergies: Allergies as of 2023    (No Known Allergies)       Medications:  No current facility-administered medications on file prior to encounter. Current Outpatient Medications on File Prior to Encounter   Medication Sig Dispense Refill    Docusate Sodium 100 MG TABS Take by mouth      blood glucose monitor kit and supplies Dispense sufficient amount for indicated testing frequency plus additional to accommodate PRN testing needs.  Dispense all needed supplies to include: monitor, strips, lancing device, lancets, control solutions, alcohol swabs.  1 kit 0    Prenatal MV-Min-Fe Fum-FA-DHA (PRENATAL 1 PO) Take by mouth (Patient not taking: Reported on 1/3/2023)      vitamin D (ERGOCALCIFEROL) 1.25 MG (50985 UT) CAPS capsule Take 1 capsule by mouth once a week 30 capsule 0          Steroids Given In This Pregnancy:  no     Past Medical History:   Diagnosis Date    Gestational hypertension     Pregnancy 1 & 2       Past Surgical History:   Procedure Laterality Date    TONSILLECTOMY AND ADENOIDECTOMY      WISDOM TOOTH EXTRACTION         OB History    Para Term  AB Living   8 7 7 0 0 7   SAB IAB Ectopic Molar Multiple Live Births   0 0 0 0 0 7      # Outcome Date GA Lbr Ben/2nd Weight Sex Delivery Anes PTL Lv   8 Current            7 Term 16 40w2d   F Vag-Spont   ALIN      Name: Domonique Henao   6 Term 13 38w0d   M Vag-Spont None N ALIN      Name: Shweta Mota    5 Term 11   9 lb (4.082 kg) F Vag-Spont None N ALIN      Name: Darwin Kaufman    4 Term 09   8 lb (3.629 kg) M Vag-Spont EPI N ALIN      Name: Leslee Lizarraga    3 Term 06 38w0d  8 lb (3.629 kg) M Vag-Spont EPI  ALIN      Complications: Hypospadias      Name: Shady Herrera   2 Term 04 38w0d  8 lb (3.629 kg) M Vag-Spont EPI N ALIN      Name: Patricia Humphrey    1 Term 01 38w0d  8 lb (3.629 kg) M Vag-Spont  N ALIN      Birth Comments: Length of Labor- 12      Name: Azam Borne        Family History   Problem Relation Age of Onset    Diabetes Maternal Grandmother     Breast Cancer Maternal Grandmother         unsure of age    Diabetes Maternal Grandfather     Colon Cancer Mother 48    Cervical Cancer Mother     Cancer Sister         hodgkins    Hypertension Father          Social History     Socioeconomic History    Marital status:      Spouse name: Not on file    Number of children: Not on file    Years of education: Not on file    Highest education level: Not on file   Occupational History    Not on file   Tobacco Use    Smoking status: Never    Smokeless tobacco: Never   Vaping Use    Vaping Use: Never used   Substance and Sexual Activity    Alcohol use: No     Alcohol/week: 0.0 standard drinks    Drug use: No    Sexual activity: Yes     Partners: Male     Birth control/protection: Condom   Other Topics Concern    Not on file   Social History Narrative    Not on file     Social Determinants of Health     Financial Resource Strain: Low Risk     Difficulty of Paying Living Expenses: Not hard at all   Food Insecurity: No Food Insecurity    Worried About Running Out of Food in the Last Year: Never true    Ran Out of Food in the Last Year: Never true   Transportation Needs: Not on file   Physical Activity: Not on file   Stress: Not on file   Social Connections: Not on file   Intimate Partner Violence: Not on file   Housing Stability: Not on file       Review Of Systems:  A minimum of an eleven point review of systems was completed.     REVIEW OF SYSTEMS:   Constitutional: negative fever, negative chills, negative weight changes   HEENT: negative visual disturbances, negative headaches, negative dizziness, negative hearing loss  Breast: Negative breast abnormalities, negative breast lumps, negative nipple discharge  Respiratory: negative dyspnea, negative cough, negative SOB  Cardiovascular: negative chest pain,  negative palpitations, negative arrhythmia, negative syncope   Gastrointestinal: negative abdominal pain, negative RUQ pain, negative N/V, negative diarrhea, negative constipation, negative bowel changes, negative heartburn   Genitourinary: negative dysuria, negative hematuria, negative urinary incontinence, negative vaginal discharge, negative vaginal bleeding or spotting  Dermatological: negative rash, negative pruritis, negative mole or other skin changes  Hematologic: negative bruising  Immunologic/Lymphatic: negative recent illness, negative recent sick contact  Musculoskeletal: negative back pain, negative myalgias, negative arthralgias  Neurological:  negative dizziness, negative migraines, negative seizures, negative weakness  Behavior/Psych: negative depression, negative anxiety, negative SI, negative HI    Physical Exam:  Vitals:    01/09/23 0121 01/09/23 0122   BP: 131/84    Pulse: (!) 104    Resp:  18   Temp:  97.8 °F (36.6 °C)   TempSrc:  Oral   SpO2:  100%     There is no height or weight on file to calculate BMI. General appearance:  Alert, no apparent distress, and cooperative  Skin:  Warm, dry, no rashes or erythema  Neurologic:  alert, oriented, normal speech and gait, normal reflexes  Lungs:  No increased work of breathing, good air exchange, clear to auscultation bilaterally, no crackles or wheezing  Heart:  regular rate and rhythm and no murmur   Breast:  Deferred   Abdomen:  soft, non-tender, no right upper quadrant tenderness, no CVA tenderness. Gravid and consistent with her gestational age, EFW by Leopold's Maneuver was 8.5  Uterus non-tender, no signs of abruption and no signs of chorioamnionitis  Extremities:  no calf tenderness, non edematous, DTRs: normal    PELVIC EXAM:               Proven to 9lbs   Cervix Check: 5 cm dilated, 90 % effaced, -1 station, posterior position, soft consistency, Cephalic   Bishops Score: 10   0 1 2 3   Position Posterior Intermediate Anterior -   Consistency Firm Intermediate Soft -   Effacement 0-30% 31-50% 51-80% >80%   Dilation 0cm 1-2cm 3-4cm >5cm   Fetal Station -3 -2 -1, 0 +1, +2         MEMBRANES:  Intact                      FETAL HEART RATE:       Baseline: 125     Variability: moderate     Accelerations: present     Decelerations: absent            CONTRACTIONS:   Frequency: varied, difficult to trace; q 2-6 min                           Duration: varied; 30-60 sec                           Intensity: Moderate      PRENATAL LAB RESULTS:   Blood Type/Rh: B pos  Antibody Screen: negative  Hemoglobin, Hematocrit, Platelets: 45.4/27.5/278  Rubella: immune  T.  Pallidum, IgG: non-reactive  Hepatitis B Surface Antigen: non-reactive   Hepatitis C Antibody: non-reactive   HIV: non-reactive   Sickle Cell Screen: not done  Gonorrhea: negative  Chlamydia: negative  Urine culture: negative    1 hour Glucose Tolerance Test:  164  3 hour Glucose Tolerance Test: Fastin; 1 hour: 207; 2 hour  206; 3 hour: 141    Group B Strep: negative   Cystic Fibrosis Screen: not done  First Trimester Screen: not done  MSAFP/Multiple Markers: not done  Non-Invasive Prenatal Testing: not done  Anatomy US: posterior placenta, 3VC, male gender, normal  anatomy    ASSESSMENT/PLAN:  Kailash Fair is a 38 y.o. female  [id number] At 44w9d  Admit:   - IV heplock  - anticipate   - Written consent for UDS obtained, specimen collected  - Low intervention wishes; see birth plan    Gestational diabetic insulin controlled  - Finger sticks q2hrs when in active labor  - Diet: low carb    AMA  - Normal NSTs in office  - Declined 39wk induction  - Pt declined continuous fetal monitoring. Education provided and pt was agreeable to intermittent heart tones. Grand multip      FHR: Category 1      Risks, benefits, alternatives and possible complications have been discussed in detail with the patient. Admission, and post admission procedures and expectations were discussed in detail. All questions were answered. Discussion of both the risks benefits and alternative options were discussed as well as the potential maternal and fetal morbidity risks. Plan discussed with Dr. Delano Mosqueda, who is agreeable.      RAH Leonard CNM  Midwife RAH-MANJIT  2023, 1:59 AM

## 2023-01-09 NOTE — CARE COORDINATION
Social Work     Sw reviewed medical record (current active problem list) and tox screens and found no concerns. No current AMILCAR completed. Sw spoke with mom briefly to explain Sw role, inquire if any needs or concerns, and provide safe sleep education and discuss. Mom denied any needs or questions and informs baby has a safe sleep environment (crib). Mom denied any current s/s of anxiety or depression and is aware to reach out to OB if any s/s occur after dc. Mom reports a good support system and denied any current questions or needs. Mom reports this is her 8th child ( 21 years down to 4 years, now baby). Mom states ped will be Family  In [place]. Mom very rushed during assessment and not happy as she states she just answered may of same questions for other staff. Sw apologized, attempted to be as quick as possible. Sw encouraged mom to reach out if any issues or concerns arise.

## 2023-01-09 NOTE — PROGRESS NOTES
Patient admitted to room 736 from 708 via wheel chair. Oriented to room and surroundings. Plan of care reviewed. Verbalized understanding. Instructed on infant security and safe sleep practices. Preventing falls education provided . The following handouts given: A New Beginning: Your Guide to Postpartum Care, Rounding, gs Security System,Babies Cry A lot, Safe Sleep, Security and Visitation Guidelines. Call light placed within reach.

## 2023-01-10 VITALS
WEIGHT: 207 LBS | SYSTOLIC BLOOD PRESSURE: 110 MMHG | TEMPERATURE: 97.9 F | RESPIRATION RATE: 16 BRPM | HEIGHT: 65 IN | OXYGEN SATURATION: 97 % | BODY MASS INDEX: 34.49 KG/M2 | HEART RATE: 91 BPM | DIASTOLIC BLOOD PRESSURE: 68 MMHG

## 2023-01-10 PROCEDURE — 99024 POSTOP FOLLOW-UP VISIT: CPT | Performed by: ADVANCED PRACTICE MIDWIFE

## 2023-01-10 PROCEDURE — 6370000000 HC RX 637 (ALT 250 FOR IP): Performed by: ADVANCED PRACTICE MIDWIFE

## 2023-01-10 RX ORDER — PSEUDOEPHEDRINE HCL 30 MG
100 TABLET ORAL 2 TIMES DAILY
Qty: 20 CAPSULE | Refills: 0 | Status: SHIPPED | OUTPATIENT
Start: 2023-01-10

## 2023-01-10 RX ORDER — IBUPROFEN 600 MG/1
600 TABLET ORAL EVERY 8 HOURS SCHEDULED
Qty: 30 TABLET | Refills: 0 | Status: SHIPPED | OUTPATIENT
Start: 2023-01-10

## 2023-01-10 RX ADMIN — IBUPROFEN 600 MG: 600 TABLET, FILM COATED ORAL at 14:06

## 2023-01-10 RX ADMIN — DOCUSATE SODIUM 100 MG: 100 CAPSULE ORAL at 08:42

## 2023-01-10 RX ADMIN — IBUPROFEN 600 MG: 600 TABLET, FILM COATED ORAL at 05:53

## 2023-01-10 RX ADMIN — ACETAMINOPHEN 1000 MG: 500 TABLET ORAL at 08:41

## 2023-01-10 ASSESSMENT — PAIN - FUNCTIONAL ASSESSMENT
PAIN_FUNCTIONAL_ASSESSMENT: ACTIVITIES ARE NOT PREVENTED

## 2023-01-10 ASSESSMENT — PAIN DESCRIPTION - LOCATION
LOCATION: ABDOMEN

## 2023-01-10 ASSESSMENT — PAIN SCALES - GENERAL
PAINLEVEL_OUTOF10: 4
PAINLEVEL_OUTOF10: 1
PAINLEVEL_OUTOF10: 2
PAINLEVEL_OUTOF10: 3

## 2023-01-10 ASSESSMENT — PAIN DESCRIPTION - DESCRIPTORS
DESCRIPTORS: ACHING;CRAMPING
DESCRIPTORS: CRAMPING
DESCRIPTORS: CRAMPING

## 2023-01-10 ASSESSMENT — PAIN DESCRIPTION - ORIENTATION
ORIENTATION: MID;LOWER
ORIENTATION: LOWER;MID

## 2023-01-10 NOTE — PLAN OF CARE
Problem: Pain  Goal: Verbalizes/displays adequate comfort level or baseline comfort level  Outcome: Completed  Flowsheets (Taken 1/10/2023 0800)  Verbalizes/displays adequate comfort level or baseline comfort level:   Encourage patient to monitor pain and request assistance   Assess pain using appropriate pain scale   Administer analgesics based on type and severity of pain and evaluate response   Implement non-pharmacological measures as appropriate and evaluate response   Consider cultural and social influences on pain and pain management   Notify Licensed Independent Practitioner if interventions unsuccessful or patient reports new pain     Problem: Safety - Adult  Goal: Free from fall injury  Outcome: Completed     Problem: Vaginal Birth or  Section  Goal: Fetal and maternal status remain reassuring during the birth process  Description:  Birth OB-Pregnancy care plan goal which identifies if the fetal and maternal status remain reassuring during the birth process  Outcome: Completed     Problem: Postpartum  Goal: Experiences normal postpartum course  Description:  Postpartum OB-Pregnancy care plan goal which identifies if the mother is experiencing a normal postpartum course  Outcome: Completed  Goal: Appropriate maternal -  bonding  Description:  Postpartum OB-Pregnancy care plan goal which identifies if the mother and  are bonding appropriately  Outcome: Completed  Goal: Establishment of infant feeding pattern  Description:  Postpartum OB-Pregnancy care plan goal which identifies if the mother is establishing a feeding pattern with their   Outcome: Completed  Goal: Incisions, wounds, or drain sites healing without S/S of infection  Outcome: Completed     Problem: Infection - Adult  Goal: Absence of infection at discharge  Outcome: Completed  Goal: Absence of infection during hospitalization  Outcome: Completed  Goal: Absence of fever/infection during anticipated neutropenic period  Outcome: Completed     Problem: Discharge Planning  Goal: Discharge to home or other facility with appropriate resources  Outcome: Completed     Problem: Chronic Conditions and Co-morbidities  Goal: Patient's chronic conditions and co-morbidity symptoms are monitored and maintained or improved  Outcome: Completed

## 2023-01-10 NOTE — DISCHARGE SUMMARY
Obstetric Discharge Summary  [facility]    Patient Name: Emmett Mcpherson  Patient : 1980  Primary Care Physician: Tariq Cheung MD  Admit Date: 2023    Principal Diagnosis: IUP at 41w5d, admitted for Onset of Labor       Her pregnancy has been complicated by:   Patient Active Problem List   Diagnosis    Advanced maternal age in multigravida    Cystocele, midline grade 1    Cervical polyp    Fingernail abnormalities    Chronic fatigue    Vitamin D insufficiency    Abnormal glucose> GDM    Diet controlled gestational diabetes mellitus (GDM) in third trimester    High risk multigravida in third trimester    Psoriasis    Grand multipara    History of gestational hypertension (G1)    Normal labor     2023 male    Vaginal delivery       Infection Present?: No  Hospital Acquired: No    Surgical Operations & Procedures:  [] Pitocin Induction of Labor  [] Pitocin Augmentation of Labor  [] Prostaglandin Induction of Labor  [] Cytotec (Misoprostol)  [] Mechanical Induction of Labor  [] Artificial Rupture of Membranes  [] Intrauterine Pressure Catheter  [] Fetal Scalp Electrode  [] Amnioinfusion  [] Antibiotic Prophylaxis    Analgesia: none  Delivery Type: Spontaneous Vaginal Delivery:    Laceration(s): Absent    Consultations: none    Pertinent Findings & Procedures:   Emmett Mcpherson is a 38 y.o. female [id number] at 41w5d admitted for labor; received none.  She delivered by spontaneous vaginal a Live Born      Information for the patient's :  Tressa Fitzgerald [[id number]]   male   Birth Weight: 9 lb 8.9 oz (4.335 kg)     Apgars:   Information for the patient's :  Tressa Fitzgerald [[id number]]        Postpartum course: normal.      Course of patient: uncomplicated    Discharge to: Home    Readmission planned: no     Recommendations on Discharge:     Medications:        Medication List        START taking these medications      ibuprofen 600 MG tablet  Commonly known as: ADVIL;MOTRIN  Take 1 tablet by mouth every 8 hours            CHANGE how you take these medications      * Docusate Sodium 100 MG Tabs  What changed: Another medication with the same name was added. Make sure you understand how and when to take each. * docusate 100 MG Caps  Commonly known as: COLACE, DULCOLAX  Take 100 mg by mouth 2 times daily  What changed: You were already taking a medication with the same name, and this prescription was added. Make sure you understand how and when to take each. * This list has 2 medication(s) that are the same as other medications prescribed for you. Read the directions carefully, and ask your doctor or other care provider to review them with you. CONTINUE taking these medications      blood glucose monitor kit and supplies  Dispense sufficient amount for indicated testing frequency plus additional to accommodate PRN testing needs. Dispense all needed supplies to include: monitor, strips, lancing device, lancets, control solutions, alcohol swabs.      PRENATAL 1 PO            ASK your doctor about these medications      vitamin D 1.25 MG (84152 UT) Caps capsule  Commonly known as: ERGOCALCIFEROL  Take 1 capsule by mouth once a week               Where to Get Your Medications        These medications were sent to [facility] [address] [facility], [address]  [address], [address] [place] [address]      Phone: [phone]   docusate 100 MG Caps  ibuprofen 600 MG tablet         Activity: Slow return to ADLs  Diet: Regular  Follow up: 2 weeks with [facility]    Condition on discharge: good    Discharge date: 1/10/2023

## 2023-01-10 NOTE — FLOWSHEET NOTE
Discharge teaching and instructions completed. AVS reviewed. [facility] Save your life: Post-Birth Warning Signs handout reviewed and given to mother. Understanding verbalized. Printed prescriptions filled by Leeann Shell. Patient voiced understanding regarding prescriptions, follow up appointments, and care of self at home.

## 2023-01-10 NOTE — PROGRESS NOTES
CLINICAL PHARMACY NOTE: MEDS TO BEDS    Total # of Prescriptions Filled: 2   The following medications were delivered to the patient:  Docusate sodium 100 mg cap  Ibuprofen 600 mg tab    Additional Documentation: Karolynn Lombard

## 2023-01-10 NOTE — LACTATION NOTE
Pt states breastfeeding is going well,  feeding regularly, voiding and stooling. Reviewed normal  feeding patterns and how to know if baby is getting enough. Encouraged pt to call out with questions or concerns. States baby has been feeding well and nipples are without pain or damage.

## 2023-01-10 NOTE — PROGRESS NOTES
[facility] Vaginal Postpartum Note      SUBJECTIVE: Doing well. Voiding, ambulating, eating. Bonding with baby. Denies leg  pain. Resting well. Pain controlled with oral meds. With baby in nursery for testing. May want to go home today. OBJECTIVE:     Vitals:  /68   Pulse 91   Temp 97.9 °F (36.6 °C) (Oral)   Resp 16   Ht 5' 5\" (1.651 m)   Wt 207 lb (93.9 kg)   SpO2 97%   Breastfeeding Unknown   BMI 34.45 kg/m²     Constitutional:  awake, alert, cooperative, no apparent distress. Bonding with baby well/appropriate maternal/infant bond demonstrated. Abdominal Exam: Fundus firm. Ubelow umb. Non-tender    Perineum: Intact and healing. Lochia: Small rubra    Breasts: Soft without tenderness, nipples soft    Extremities: Negative Juan Jose sign. ASSESSMENT:     Post partum vaginal birth Day 1  breastfeeding without difficulty  Rh pos  Rubella imm    PLANS:   Labs stable  Continue care  Discharge today.

## 2023-01-11 ENCOUNTER — TELEPHONE (OUTPATIENT)
Dept: OBGYN CLINIC | Age: 43
End: 2023-01-11

## 2023-01-11 NOTE — TELEPHONE ENCOUNTER
----- Message from RAH PenaM sent at 1/10/2023  8:06 AM EST -----  Regarding: pp visit  Pt needs 2 week pp appt scheduled